# Patient Record
Sex: MALE | Race: WHITE | NOT HISPANIC OR LATINO | Employment: FULL TIME | URBAN - METROPOLITAN AREA
[De-identification: names, ages, dates, MRNs, and addresses within clinical notes are randomized per-mention and may not be internally consistent; named-entity substitution may affect disease eponyms.]

---

## 2017-11-27 ENCOUNTER — HOSPITAL ENCOUNTER (EMERGENCY)
Facility: MEDICAL CENTER | Age: 47
End: 2017-11-27
Attending: EMERGENCY MEDICINE
Payer: COMMERCIAL

## 2017-11-27 VITALS
HEIGHT: 69 IN | SYSTOLIC BLOOD PRESSURE: 118 MMHG | WEIGHT: 178.13 LBS | RESPIRATION RATE: 18 BRPM | BODY MASS INDEX: 26.38 KG/M2 | DIASTOLIC BLOOD PRESSURE: 66 MMHG | TEMPERATURE: 98.6 F | HEART RATE: 80 BPM | OXYGEN SATURATION: 94 %

## 2017-11-27 DIAGNOSIS — J45.41 MODERATE PERSISTENT ASTHMA WITH ACUTE EXACERBATION: ICD-10-CM

## 2017-11-27 DIAGNOSIS — J20.9 ACUTE BRONCHITIS, UNSPECIFIED ORGANISM: ICD-10-CM

## 2017-11-27 PROCEDURE — 99283 EMERGENCY DEPT VISIT LOW MDM: CPT | Mod: EDC

## 2017-11-27 PROCEDURE — 700111 HCHG RX REV CODE 636 W/ 250 OVERRIDE (IP): Mod: EDC | Performed by: EMERGENCY MEDICINE

## 2017-11-27 RX ORDER — AZITHROMYCIN 250 MG/1
TABLET, FILM COATED ORAL
Qty: 6 TAB | Refills: 0 | Status: SHIPPED | OUTPATIENT
Start: 2017-11-27 | End: 2018-01-19

## 2017-11-27 RX ORDER — PREDNISONE 20 MG/1
60 TABLET ORAL ONCE
Status: COMPLETED | OUTPATIENT
Start: 2017-11-27 | End: 2017-11-27

## 2017-11-27 RX ORDER — BUDESONIDE AND FORMOTEROL FUMARATE DIHYDRATE 80; 4.5 UG/1; UG/1
2 AEROSOL RESPIRATORY (INHALATION) 2 TIMES DAILY
Qty: 1 INHALER | Refills: 0 | Status: SHIPPED | OUTPATIENT
Start: 2017-11-27

## 2017-11-27 RX ORDER — ALBUTEROL SULFATE 90 UG/1
2 AEROSOL, METERED RESPIRATORY (INHALATION) EVERY 6 HOURS PRN
COMMUNITY
End: 2018-01-19 | Stop reason: SDUPTHER

## 2017-11-27 RX ORDER — PREDNISONE 20 MG/1
40 TABLET ORAL DAILY
Qty: 10 TAB | Refills: 0 | Status: SHIPPED | OUTPATIENT
Start: 2017-11-27 | End: 2017-12-02

## 2017-11-27 RX ADMIN — PREDNISONE 60 MG: 20 TABLET ORAL at 14:31

## 2017-11-27 ASSESSMENT — PAIN SCALES - GENERAL: PAINLEVEL_OUTOF10: 0

## 2017-11-27 NOTE — ED NOTES
Shelton Milton  Chief Complaint   Patient presents with   • Cold Symptoms     1 week   • Annual Wellness Visit     pt hasn't been seen in a while     [facility] with son for same sxs.  Patient to pediatric [facility], instructed parent to notify triage RN of any changes or worsening in condition.  NAD

## 2017-11-27 NOTE — ED PROVIDER NOTES
ED Provider Note    Scribed for Marlyn Mckenzie M.D. by Tristan Quezada. 11/27/2017, 2:00 PM.    Primary care provider: No primary care provider noted.  Means of arrival: Walk in  History obtained from: Patient  History limited by: None    CHIEF COMPLAINT  Chief Complaint   Patient presents with   • Cold Symptoms     1 week   • Annual Wellness Visit     pt hasn't been seen in a while       HPI  Shelton Milton is a 47 y.o. male With a history of asthma who presents to the Emergency Department complaining of a persistent sore throat onset 1 week ago. He is experiencing associated redness and swelling to his throat, chest tightness, shortness of breath and productive cough. He describes his cough as chronic and coughing up yellow/green phlegm. The patient has a history of asthma and uses an albuterol inhaler at home every day. He does not have a steroid inhaler at this time. No complaints of fever at this time.    REVIEW OF SYSTEMS  Pertinent positives include sore throat with redness and swelling, chest tightness, shortness of breath, productive cough. Pertinent negatives include no fever. See HPI for further details. E.     PAST MEDICAL HISTORY   has a past medical history of Asthma.    SURGICAL HISTORY   has a past surgical history that includes other abdominal surgery.    SOCIAL HISTORY  Social History   Substance Use Topics   • Smoking status: Never Smoker   • Smokeless tobacco: Never Used   • Alcohol use Yes      Comment: monthly      History   Drug Use No       FAMILY HISTORY  History reviewed. No pertinent family history.    CURRENT MEDICATIONS  Home Medications     Reviewed by Estephanie Luna R.N. (Registered Nurse) on 11/27/17 at 1329  Med List Status: Complete   Medication Last Dose Status   albuterol 108 (90 Base) MCG/ACT Aero Soln inhalation aerosol 11/27/2017 Active                ALLERGIES  Allergies   Allergen Reactions   • Codeine Nausea       PHYSICAL EXAM  VITAL SIGNS: /66   Pulse 80   Temp 37  "°C (98.6 °F)   Resp 18   Ht 1.753 m (5' 9\")   Wt 80.8 kg (178 lb 2.1 oz)   SpO2 94%   BMI 26.31 kg/m²     General: WDWN, nontoxic appearing in NAD; A+Ox3; V/S as above afebrile   Skin: warm and dry; good color; no rash   HEENT: White nasal drip noticed in the posterior pharynx, NCAT; EOMs intact; PERRL; no scleral icterus   Neck: FROM; no meningismus, no LAD   Cardiovascular: Regular heart rate and rhythm. No murmurs, rubs, or gallops; pulses 2+ bilaterally radially and DP areas   Lungs: Mild inspiratory and expiratory wheezes, good air movement bilaterally. No rhonchi, or rales.   Neurologic: CNs III-XII grossly intact; speech clear; distal sensation intact; strength 5/5 UE/LEs; gait steady   Psychiatric: Appropriate affect, normal mood                                                             COURSE & MEDICAL DECISION MAKING  Pertinent Labs & Imaging studies reviewed. (See chart for details)    Shelton Milton is a 47 y.o. male who presents complaining of cough and sputum production with wheezing.    2:00 PM - Patient seen and examined at bedside. I suspect reactive airways/asthma exacerbation with bronchitis. Patient was treated with prednisone 60 mg. I explained to the patient I will put him on a Z-pack due to his history of asthma and observed wheezes. We discussed using a steroid inhaler regularly to help alleviate the patient's breathing symptoms at home. I will contact the  to get the patient established with a PCP. Patient agrees with plan of care.    The patient will return for new or worsening symptoms and is stable at the time of discharge.    DISPOSITION:  Patient will be discharged home in stable condition.    FOLLOW UP:  [facility], Emergency Dept  [address] [address] [phone]    As needed, If symptoms worsen      OUTPATIENT MEDICATIONS:  Discharge Medication List as of 11/27/2017  2:24 PM      START taking these medications    Details "   predniSONE (DELTASONE) 20 MG Tab Take 2 Tabs by mouth every day for 5 days., Disp-10 Tab, R-0, Print Rx Paper      budesonide-formoterol (SYMBICORT) 80-4.5 MCG/ACT Aerosol Inhale 2 Puffs by mouth 2 Times a Day., Disp-1 Inhaler, R-0, Print Rx Paper      azithromycin (ZITHROMAX) 250 MG Tab Take two tabs by mouth on day one, then one tab by mouth daily on days 2-5., Disp-6 Tab, R-0, Print Rx Paper             FINAL IMPRESSION  1. Moderate persistent asthma with acute exacerbation    2. Acute bronchitis, unspecified organism          Tristan HERZOG (Scribdamion), am scribing for, and in the presence of, Marlyn Mckenzie M.D..    Electronically signed by: Tristan Quezada (Scribe), 11/27/2017    Marlyn HERZOG M.D. personally performed the services described in this documentation, as scribed by Tristan Quezada in my presence, and it is both accurate and complete.    The note accurately reflects work and decisions made by me.  Marlyn Mckenzie  11/28/2017  2:49 PM

## 2017-11-27 NOTE — ED NOTES
PT assessment complete. Agree with triage note. Pt c/o sore throat, redness and swelling for 1 week. Exposed to son who has been sick for a couple weeks. PT in gown. Educated on NPO status until cleared by MD. Pt is alert, active, age appropriate, and NAD. No needs. Will continue to monitor.

## 2017-11-27 NOTE — ED NOTES
Pt given meds. Discharge instructions provided to pt. Copy of instructions and rx for x3 provided to pt. Verbalized understanding. Instructed to follow up with PCP or return to ed with worsening symptoms. Educated on worsening symptoms. Educated on diet and fluid intake. Educated on pain management. Pt discharged to home. PT ambulated out of ED. Pt awake, alert, calm, NAD upon departure.

## 2017-11-27 NOTE — DISCHARGE INSTRUCTIONS
Take prednisone for 5 days  Start using Symbicort after you are finished with your prednisone  Take Z-Elliot as prescribed for possible bronchitis  Continue with albuterol every 4-6 hours as needed for wheezing  Follow-up with a primary care provider as scheduled  Return to the ER for chest pain, difficult to breathing, fever, or other concerns      Asthma, F.L.A.R.E.  Most people with asthma do not get so sick that they need emergency care. The fact that you had to get emergency care may mean:  · You are not taking your long-term control medicine the right way.   · You have not been prescribed any or enough long-term control medicine.   · You are still exposed to triggers that start your asthma symptoms.   You can avoid asthma flare-ups by using this F.L.A.R.E. plan until you see your primary doctor.  F  FOLLOW UP WITH YOUR PRIMARY DOCTOR - CALL TO MAKE AN APPOINTMENT TO BE SEEN.  · If you have trouble making an appointment, ask to speak to the office nurse.   · If you do not have a primary care doctor, call to get one.   At the follow-up appointment:  · Bring all of your medications and this plan with you.   · Make an asthma action plan with your doctor that you can follow every day to keep your asthma under control.   · Write down your questions and your doctor's answers.   This will make your emergency visits rare.  L  LEARN ABOUT YOUR ASTHMA MEDICINES. TAKE ALL OF THESE MEDICINES JUST AS THE DOCTOR TELLS YOU, EVEN IF YOU ARE FEELING MUCH BETTER.  Quick-relief or Rescue  · Kind of medicine   · Name of medicine   · How much   · How often & how long you need to take it   Long-term control  · Kind of medicine   · Name of medicine   · How much   · How often & how long you need to take it   Steroid pills or syrup  · Kind of medicine   · Name of medicine   · How much   · How often & how long you need to take it   A  ASTHMA IS A LIFE-LONG (CHRONIC) DISEASE.  Even though your breathing is better after getting emergency  "care, you still need to get long-term control of your asthma. If you do not, you are at risk for more severe flare-ups and even death.  · If you use quick-relief medicine more than 2 times per week, your asthma is not under control. You need to see your doctor or an asthma specialist to make a plan to get control of your asthma.   · Take long-term control medicine every day as ordered by your doctor.   · Figure out what things make your asthma flare up and try to stay away from these \"triggers.\"   R  RESPOND TO THESE WARNING SIGNS THAT YOUR ASTHMA IS GETTING WORSE:  · Your chest feels tight.   · You are short of breath.   · You are wheezing.   · You are coughing.   · Your peak flow is getting low.   Keep taking your medicines as prescribed and call your doctor.  E  EMERGENCY CARE MAY BE NEEDED IF YOU:  · Have trouble talking.   · Are working hard to breathe (may see skin sucking in at rib cage or above breast bone).   · Need to use quick-relief medicine more often than every 4 hours.   · See your peak flow dropping.   Take your quick-relief medicine and wait 20 minutes. If you do not feel better, take it again and wait 20 minutes. If you still do not feel better, take it again and call your doctor or 911 right away!  LEARN ABOUT ASTHMA  Asthma is a life-long disease that can make it hard for you to get air in and out of your lungs. Your asthma triggers make the air tubes in your lungs get smaller. These are the tubes that carry air in and out of your lungs.  Here is what happens:  · Small breathing tubes in your lungs swell and make extra mucus.   · Muscles around the small breathing tubes get tight and make them smaller.   · Smaller breathing tubes then get clogged with the extra mucus.   · Swelling, muscle tightness, and mucus make it harder for you to breathe. You start to cough and wheeze and your chest might feel tight.   Not all asthma flare-ups are the same. Some are worse than others. In a severe asthma " flare-up, the breathing tubes get so small that air cannot get in and out of the lungs. People can die if their asthma flare-up is severe.  ASTHMA MEDICINES  · Quick-relief or Rescue medicine: should help for about 4 hours, relaxes the muscles around your breathing tubes so air can get in and out. If you need to take quick-relief medicine more than 2 times per week, your asthma is not under control, and you should ask your doctor about long-term control medicine.   · Long-term control medicine: must be taken every day in order to work right. It keeps your breathing tubes from swelling, and can prevent most asthma flare-ups. This medicine cannot stop a flare-up once it starts. During flare-ups, use quick-relief medicine right away and take your long-term control medicine as usual.   · Steroid pills or syrup: can help the swelling in your breathing tubes go away. You must take this medicine just as the doctor tells you to. Do not skip a dose, and do not stop taking it unless a doctor tells you to stop.   TRIGGERS: TELL YOUR DOCTOR ABOUT THE THINGS THAT MAKE YOUR ASTHMA WORSE.  What started or triggered your asthma flare-up this time?  COMMON ASTHMA TRIGGERS:  · Breathing in chemicals, dusts, or fumes at work.   · Colds or flu.   · Animals.   · Dust.   · Pollen and mold.   · Strong odors.   · Weather.   · Exercise.   · Cigarette and other smoke.   · Medicines.   Smoking and secondhand smoke are asthma triggers. If you smoke, choose to quit. Never let others smoke near you or your children. Call your doctor or your health plan for help quitting.  FOR MORE INFORMATION  [facility]: www.GetAsthmaHelp.org  American Lung Association: www.lungusa.org  Asthma and Allergy Foundation of [place]: www.aafa.org  This plan and asthma information are based on the NAEPP Guidelines for the Diagnosis and Management of Asthma, (http://www.nhlbi.nig.gov/guidelines/asthma/asthgdln.htm).  Document Released: 07/26/2007  Document Revised: 03/11/2013 Document Reviewed: 10/04/2007  ExitCare® Patient Information ©2013 [employer].        Acute Bronchitis  Bronchitis is inflammation of the airways that extend from the windpipe into the lungs (bronchi). The inflammation often causes mucus to develop. This leads to a cough, which is the most common symptom of bronchitis.   In acute bronchitis, the condition usually develops suddenly and goes away over time, usually in a couple weeks. Smoking, allergies, and asthma can make bronchitis worse. Repeated episodes of bronchitis may cause further lung problems.   CAUSES  Acute bronchitis is most often caused by the same virus that causes a cold. The virus can spread from person to person (contagious) through coughing, sneezing, and touching contaminated objects.  SIGNS AND SYMPTOMS   · Cough.    · Fever.    · Coughing up mucus.    · Body aches.    · Chest congestion.    · Chills.    · Shortness of breath.    · Sore throat.    DIAGNOSIS   Acute bronchitis is usually diagnosed through a physical exam. Your health care provider will also ask you questions about your medical history. Tests, such as chest X-rays, are sometimes done to rule out other conditions.   TREATMENT   Acute bronchitis usually goes away in a couple weeks. Oftentimes, no medical treatment is necessary. Medicines are sometimes given for relief of fever or cough. Antibiotic medicines are usually not needed but may be prescribed in certain situations. In some cases, an inhaler may be recommended to help reduce shortness of breath and control the cough. A cool mist vaporizer may also be used to help thin bronchial secretions and make it easier to clear the chest.   HOME CARE INSTRUCTIONS  · Get plenty of rest.    · Drink enough fluids to keep your urine clear or pale yellow (unless you have a medical condition that requires fluid restriction). Increasing fluids may help thin your respiratory secretions (sputum) and reduce chest  congestion, and it will prevent dehydration.    · Take medicines only as directed by your health care provider.  · If you were prescribed an antibiotic medicine, finish it all even if you start to feel better.  · Avoid smoking and secondhand smoke. Exposure to cigarette smoke or irritating chemicals will make bronchitis worse. If you are a smoker, consider using nicotine gum or skin patches to help control withdrawal symptoms. Quitting smoking will help your lungs heal faster.    · Reduce the chances of another bout of acute bronchitis by washing your hands frequently, avoiding people with cold symptoms, and trying not to touch your hands to your mouth, nose, or eyes.    · Keep all follow-up visits as directed by your health care provider.    SEEK MEDICAL CARE IF:  Your symptoms do not improve after 1 week of treatment.   SEEK IMMEDIATE MEDICAL CARE IF:  · You develop an increased fever or chills.    · You have chest pain.    · You have severe shortness of breath.  · You have bloody sputum.    · You develop dehydration.  · You faint or repeatedly feel like you are going to pass out.  · You develop repeated vomiting.  · You develop a severe headache.  MAKE SURE YOU:   · Understand these instructions.  · Will watch your condition.  · Will get help right away if you are not doing well or get worse.     This information is not intended to replace advice given to you by your health care provider. Make sure you discuss any questions you have with your health care provider.     Document Released: 01/25/2006 Document Revised: 01/08/2016 Document Reviewed: 06/10/2014  L[employer] Interactive Patient Education ©2016 [employer]

## 2017-11-28 ENCOUNTER — PATIENT OUTREACH (OUTPATIENT)
Dept: HEALTH INFORMATION MANAGEMENT | Facility: OTHER | Age: 47
End: 2017-11-28

## 2017-11-28 NOTE — PROGRESS NOTES
Placed discharge outreach phone call to patient s/p ER discharge 11/27/17.  Left voicemail providing my contact information and instructions to call with any questions or concerns.

## 2018-01-19 ENCOUNTER — HOSPITAL ENCOUNTER (OUTPATIENT)
Dept: RADIOLOGY | Facility: MEDICAL CENTER | Age: 48
End: 2018-01-19
Attending: FAMILY MEDICINE
Payer: COMMERCIAL

## 2018-01-19 ENCOUNTER — OFFICE VISIT (OUTPATIENT)
Dept: MEDICAL GROUP | Facility: PHYSICIAN GROUP | Age: 48
End: 2018-01-19
Payer: COMMERCIAL

## 2018-01-19 VITALS
WEIGHT: 180 LBS | HEIGHT: 69 IN | HEART RATE: 74 BPM | DIASTOLIC BLOOD PRESSURE: 74 MMHG | TEMPERATURE: 98 F | SYSTOLIC BLOOD PRESSURE: 108 MMHG | OXYGEN SATURATION: 96 % | BODY MASS INDEX: 26.66 KG/M2

## 2018-01-19 DIAGNOSIS — M25.521 RIGHT ELBOW PAIN: ICD-10-CM

## 2018-01-19 DIAGNOSIS — G89.29 CHRONIC PAIN OF BOTH KNEES: ICD-10-CM

## 2018-01-19 DIAGNOSIS — N52.8 OTHER MALE ERECTILE DYSFUNCTION: ICD-10-CM

## 2018-01-19 DIAGNOSIS — M25.561 CHRONIC PAIN OF BOTH KNEES: ICD-10-CM

## 2018-01-19 DIAGNOSIS — M25.562 CHRONIC PAIN OF BOTH KNEES: ICD-10-CM

## 2018-01-19 DIAGNOSIS — K21.9 GASTROESOPHAGEAL REFLUX DISEASE WITHOUT ESOPHAGITIS: ICD-10-CM

## 2018-01-19 DIAGNOSIS — G47.33 OSA (OBSTRUCTIVE SLEEP APNEA): ICD-10-CM

## 2018-01-19 PROBLEM — M79.601 PAIN OF RIGHT UPPER EXTREMITY: Status: RESOLVED | Noted: 2018-01-19 | Resolved: 2018-01-19

## 2018-01-19 PROBLEM — M79.601 PAIN OF RIGHT UPPER EXTREMITY: Status: ACTIVE | Noted: 2018-01-19

## 2018-01-19 PROCEDURE — 99204 OFFICE O/P NEW MOD 45 MIN: CPT | Performed by: FAMILY MEDICINE

## 2018-01-19 PROCEDURE — 73080 X-RAY EXAM OF ELBOW: CPT | Mod: RT

## 2018-01-19 RX ORDER — DEXAMETHASONE 4 MG/1
4 TABLET ORAL 2 TIMES DAILY
Qty: 10 TAB | Refills: 0 | Status: SHIPPED | OUTPATIENT
Start: 2018-01-19 | End: 2018-03-02

## 2018-01-19 RX ORDER — ALBUTEROL SULFATE 90 UG/1
2 AEROSOL, METERED RESPIRATORY (INHALATION) EVERY 6 HOURS PRN
Qty: 8.5 G | Refills: 3 | Status: SHIPPED | OUTPATIENT
Start: 2018-01-19

## 2018-01-20 NOTE — ASSESSMENT & PLAN NOTE
New problem. Patient is reporting the last couple of years he has had difficulties with both achieving and maintaining an erection. He denies any problems with orgasm. He is reporting also some issues with starting a urinary flow. He denies any increase in urinary frequency or pain with urination.

## 2018-01-20 NOTE — ASSESSMENT & PLAN NOTE
Ongoing issue. Patient reports achy pain in both of his knees. He reports that the pain is worse with activity; better with rest. He takes over-the-counter ibuprofen which is mildly beneficial. He denies any swelling, numbness, tingling, weakness.

## 2018-01-20 NOTE — ASSESSMENT & PLAN NOTE
Long-standing problem. Patient reports a greater than 10 year history of pain on the outside of the right elbow. He reports that in the last several months it has gotten progressively worse to the point that with minimal activity or with mild palpation. Patient is reporting the pain can radiate down his forearm. He works as a  and so he uses his arms frequently. He denies any swelling. He has been using over-the-counter ibuprofen but it is not been beneficial in taking care of the pain

## 2018-01-20 NOTE — ASSESSMENT & PLAN NOTE
Long-standing history. Patient reports a history of reflux secondary to hiatal hernia. He states that in his lifetime he has had 2 surgeries to correct the hiatal hernia. He reports that since his second surgery 3 years ago he continues to have reflux. We have discussed some diet changes that he needs to make to help with his reflux issues. He currently does not take any medication for this problem

## 2018-01-20 NOTE — PROGRESS NOTES
Shelton Milton is a 47 y.o. male here for elbow pain, knee pain, GERD, JULIAN, fatigue, ED  HPI:  46 yo M here today to est care with his partner present and presents with the following concerns:   Right elbow pain  Long-standing problem. Patient reports a greater than 10 year history of pain on the outside of the right elbow. He reports that in the last several months it has gotten progressively worse to the point that with minimal activity or with mild palpation. Patient is reporting the pain can radiate down his forearm. He works as a  and so he uses his arms frequently. He denies any swelling. He has been using over-the-counter ibuprofen but it is not been beneficial in taking care of the pain    Other male erectile dysfunction  New problem. Patient is reporting the last couple of years he has had difficulties with both achieving and maintaining an erection. He denies any problems with orgasm. He is reporting also some issues with starting a urinary flow. He denies any increase in urinary frequency or pain with urination.    JULIAN (obstructive sleep apnea)  Ongoing issue. Patient reports that he was diagnosed several years ago with obstructive sleep apnea but never completed the workup to get a CPAP. His partner is reporting that he does snore and is very restless when he is sleeping at night.    Gastroesophageal reflux disease without esophagitis  Long-standing history. Patient reports a history of reflux secondary to hiatal hernia. He states that in his lifetime he has had 2 surgeries to correct the hiatal hernia. He reports that since his second surgery 3 years ago he continues to have reflux. We have discussed some diet changes that he needs to make to help with his reflux issues. He currently does not take any medication for this problem    Chronic pain of both knees  Ongoing issue. Patient reports achy pain in both of his knees. He reports that the pain is worse with activity; better with rest. He takes  "over-the-counter ibuprofen which is mildly beneficial. He denies any swelling, numbness, tingling, weakness.    Current medicines (including changes today)  Current Outpatient Prescriptions   Medication Sig Dispense Refill   • dexamethasone (DECADRON) 4 MG Tab Take 1 Tab by mouth 2 times a day. 10 Tab 0   • albuterol 108 (90 Base) MCG/ACT Aero Soln inhalation aerosol Inhale 2 Puffs by mouth every 6 hours as needed for Shortness of Breath. 8.5 g 3   • budesonide-formoterol (SYMBICORT) 80-4.5 MCG/ACT Aerosol Inhale 2 Puffs by mouth 2 Times a Day. 1 Inhaler 0     No current facility-administered medications for this visit.      He  has a past medical history of Asthma and GERD (gastroesophageal reflux disease). He also has no past medical history of Hyperlipidemia or Hypertension.  He  has a past surgical history that includes other abdominal surgery and hiatal hernia repair.  Social History   Substance Use Topics   • Smoking status: Current Every Day Smoker     Packs/day: 0.25     Years: 4.00     Types: Cigarettes   • Smokeless tobacco: Never Used   • Alcohol use Yes      Comment: monthly     Social History     Social History Narrative   • No narrative on file     Family History   Problem Relation Age of Onset   • Diabetes Mother    • Diabetes Father    • Hypertension Father    • Other Father      gout   • Stroke Sister    • Diabetes Paternal Uncle    • Cancer Paternal Uncle      bladder     Family Status   Relation Status   • Mother Alive   • Father Alive   • Sister Alive   • Paternal Uncle          ROS  +elbow and bilateral knee pain; no chest pain or abdominal pain  All other systems reviewed and are negative     Objective:     Blood pressure 108/74, pulse 74, temperature 36.7 °C (98 °F), height 1.753 m (5' 9\"), weight 81.6 kg (180 lb), SpO2 96 %. Body mass index is 26.58 kg/m².  Physical Exam:    Constitutional: Alert, no distress.  Skin: Warm, dry, good turgor, no rashes in visible areas.  Eye: Equal, round and " reactive, conjunctiva clear, lids normal.  ENMT: Lips without lesions, good dentition, oropharynx clear.  Neck: Trachea midline, no masses, no thyromegaly. No cervical or supraclavicular lymphadenopathy.  Respiratory: Unlabored respiratory effort, lungs clear to auscultation, no wheezes, no ronchi.  Cardiovascular: Normal S1, S2, no murmur, no edema.  Psych: Alert and oriented x3, normal affect and mood.  Neuro: CN 2-12 grossly intact  MSK: right elbow - significant TTP to the lateral epicondyle groove; no swelling/erythema    Assessment and Plan:   The following treatment plan was discussed     1. JULIAN (obstructive sleep apnea)  REFERRAL TO PULMONOLOGY    Uncontrolled. Referral to pulmonology for further evaluation and treatment   2. Right elbow pain  DX-ELBOW-COMPLETE 3+ RIGHT    Uncontrolled. Sent for x-ray for initial evaluation; prescription for dexamethasone provided; consider MRI   3. Other male erectile dysfunction  COMP METABOLIC PANEL    LIPID PROFILE    VITAMIN D,25 HYDROXY    PROSTATE SPECIFIC AG SCREENING    Uncontrolled. Sent for labs and monitor for results   4. Chronic pain of both knees      Uncontrolled. Reevaluate at next appointment   5. Gastroesophageal reflux disease without esophagitis      Uncontrolled. Reevaluate at next appointment        Records requested.  Followup: Return in about 6 weeks (around 3/2/2018) for knee/reflux/lab review, Long.

## 2018-01-20 NOTE — ASSESSMENT & PLAN NOTE
Ongoing issue. Patient reports that he was diagnosed several years ago with obstructive sleep apnea but never completed the workup to get a CPAP. His partner is reporting that he does snore and is very restless when he is sleeping at night.

## 2018-01-23 ENCOUNTER — TELEPHONE (OUTPATIENT)
Dept: MEDICAL GROUP | Facility: PHYSICIAN GROUP | Age: 48
End: 2018-01-23

## 2018-01-24 NOTE — TELEPHONE ENCOUNTER
----- Message from Mita Noriega D.O. sent at 1/23/2018  7:51 AM PST -----  Please advise pt that the x-ray of the elbow did not show any breaks in the bone or dislocation. We could consider an MRI; please let me know.    Mita Noriega D.O.

## 2018-02-08 DIAGNOSIS — G47.33 OSA (OBSTRUCTIVE SLEEP APNEA): ICD-10-CM

## 2018-02-17 ENCOUNTER — HOSPITAL ENCOUNTER (OUTPATIENT)
Dept: LAB | Facility: MEDICAL CENTER | Age: 48
End: 2018-02-17
Attending: FAMILY MEDICINE
Payer: COMMERCIAL

## 2018-02-17 DIAGNOSIS — N52.8 OTHER MALE ERECTILE DYSFUNCTION: ICD-10-CM

## 2018-02-17 LAB
25(OH)D3 SERPL-MCNC: 14 NG/ML (ref 30–100)
ALBUMIN SERPL BCP-MCNC: 4.5 G/DL (ref 3.2–4.9)
ALBUMIN/GLOB SERPL: 2 G/DL
ALP SERPL-CCNC: 46 U/L (ref 30–99)
ALT SERPL-CCNC: 34 U/L (ref 2–50)
ANION GAP SERPL CALC-SCNC: 6 MMOL/L (ref 0–11.9)
AST SERPL-CCNC: 24 U/L (ref 12–45)
BILIRUB SERPL-MCNC: 0.5 MG/DL (ref 0.1–1.5)
BUN SERPL-MCNC: 13 MG/DL (ref 8–22)
CALCIUM SERPL-MCNC: 9.1 MG/DL (ref 8.5–10.5)
CHLORIDE SERPL-SCNC: 105 MMOL/L (ref 96–112)
CHOLEST SERPL-MCNC: 234 MG/DL (ref 100–199)
CO2 SERPL-SCNC: 27 MMOL/L (ref 20–33)
CREAT SERPL-MCNC: 0.7 MG/DL (ref 0.5–1.4)
GLOBULIN SER CALC-MCNC: 2.3 G/DL (ref 1.9–3.5)
GLUCOSE SERPL-MCNC: 93 MG/DL (ref 65–99)
HDLC SERPL-MCNC: 55 MG/DL
LDLC SERPL CALC-MCNC: 154 MG/DL
POTASSIUM SERPL-SCNC: 3.9 MMOL/L (ref 3.6–5.5)
PROT SERPL-MCNC: 6.8 G/DL (ref 6–8.2)
PSA SERPL-MCNC: 0.71 NG/ML (ref 0–4)
SODIUM SERPL-SCNC: 138 MMOL/L (ref 135–145)
TRIGL SERPL-MCNC: 125 MG/DL (ref 0–149)

## 2018-02-17 PROCEDURE — 84153 ASSAY OF PSA TOTAL: CPT

## 2018-02-17 PROCEDURE — 80053 COMPREHEN METABOLIC PANEL: CPT

## 2018-02-17 PROCEDURE — 80061 LIPID PANEL: CPT

## 2018-02-17 PROCEDURE — 82306 VITAMIN D 25 HYDROXY: CPT

## 2018-02-17 PROCEDURE — 36415 COLL VENOUS BLD VENIPUNCTURE: CPT

## 2018-03-02 ENCOUNTER — HOSPITAL ENCOUNTER (OUTPATIENT)
Dept: RADIOLOGY | Facility: MEDICAL CENTER | Age: 48
End: 2018-03-02
Attending: FAMILY MEDICINE
Payer: COMMERCIAL

## 2018-03-02 ENCOUNTER — OFFICE VISIT (OUTPATIENT)
Dept: MEDICAL GROUP | Facility: PHYSICIAN GROUP | Age: 48
End: 2018-03-02
Payer: COMMERCIAL

## 2018-03-02 VITALS
OXYGEN SATURATION: 98 % | TEMPERATURE: 97.3 F | WEIGHT: 180 LBS | HEART RATE: 70 BPM | DIASTOLIC BLOOD PRESSURE: 72 MMHG | HEIGHT: 69 IN | SYSTOLIC BLOOD PRESSURE: 120 MMHG | BODY MASS INDEX: 26.66 KG/M2

## 2018-03-02 DIAGNOSIS — K21.9 GASTROESOPHAGEAL REFLUX DISEASE WITHOUT ESOPHAGITIS: ICD-10-CM

## 2018-03-02 DIAGNOSIS — M25.561 CHRONIC PAIN OF BOTH KNEES: ICD-10-CM

## 2018-03-02 DIAGNOSIS — M25.562 CHRONIC PAIN OF BOTH KNEES: ICD-10-CM

## 2018-03-02 DIAGNOSIS — G89.29 CHRONIC PAIN OF BOTH KNEES: ICD-10-CM

## 2018-03-02 DIAGNOSIS — M25.521 RIGHT ELBOW PAIN: ICD-10-CM

## 2018-03-02 DIAGNOSIS — E78.5 DYSLIPIDEMIA: ICD-10-CM

## 2018-03-02 DIAGNOSIS — R79.89 LOW VITAMIN D LEVEL: ICD-10-CM

## 2018-03-02 PROCEDURE — 99214 OFFICE O/P EST MOD 30 MIN: CPT | Performed by: FAMILY MEDICINE

## 2018-03-02 PROCEDURE — 73565 X-RAY EXAM OF KNEES: CPT

## 2018-03-03 NOTE — PROGRESS NOTES
Subjective:   Shelton Milton is a 47 y.o. male here today for bilateral knee pain, elbow pain, reflux, elevated cholesterol, low vitamin D    Chronic pain of both knees  Ongoing issue. Patient reports a long-standing history of pain in both of his knees. He describes the pain is achy in nature; comes and goes; it can be worse at the end of the day. Due to his job he does spend a lot of time crawling around on his knees and lifting heavy objects. He denies any specific trauma; he denies any swelling; currently does not take anything for the pain    Right elbow pain  Ongoing issue. Patient reports that the oral steroids were only mildly beneficial for a couple of days but then the pain returned after he stopped the medication. He currently has a mild amount of swelling and tenderness on the lateral aspect of the elbow. Continues to have some mild numbness and tingling that radiates down into the forearm. Current x-ray was negative for any significant findings    Gastroesophageal reflux disease without esophagitis  Ongoing issue. Patient currently avoids foods that bother his reflux. He has not been evaluated for H. pylori. He currently doesn't take any medication for this problem.    Dyslipidemia  New problem. Recent lab work is unrevealing detail with the patient wishes that he has a mildly elevated total cholesterol and LDL cholesterol. On discussion it appears the patient does consume a small amount high-fat foods on a very regular basis.    Low vitamin D level  New problem. Recent lab work is been reviewed in detail with the patient which shows that his vitamin D levels below recommended range at 14. He currently does not take any supplement.         Current medicines (including changes today)  Current Outpatient Prescriptions   Medication Sig Dispense Refill   • albuterol 108 (90 Base) MCG/ACT Aero Soln inhalation aerosol Inhale 2 Puffs by mouth every 6 hours as needed for Shortness of Breath. 8.5 g 3   •  "budesonide-formoterol (SYMBICORT) 80-4.5 MCG/ACT Aerosol Inhale 2 Puffs by mouth 2 Times a Day. 1 Inhaler 0     No current facility-administered medications for this visit.      He  has a past medical history of Asthma; Dyslipidemia (3/2/2018); and GERD (gastroesophageal reflux disease). He also has no past medical history of Hypertension.    ROS   No chest pain, no shortness of breath, no abdominal pain, no skin lesion, no headache  + Right elbow pain, bilateral knee pain       Objective:     Blood pressure 120/72, pulse 70, temperature 36.3 °C (97.3 °F), height 1.753 m (5' 9\"), weight 81.6 kg (180 lb), SpO2 98 %. Body mass index is 26.58 kg/m².   Physical Exam:  Alert, oriented in no acute distress.  Eye contact is good, speech goal directed, affect calm  HEENT: conjunctiva non-injected, sclera non-icteric.  Pinna normal. TM pearly gray.   Oral mucous membranes pink and moist with no lesions.  Neck No adenopathy or masses in the neck or supraclavicular regions.  Lungs: clear to auscultation bilaterally with good excursion.  CV: regular rate and rhythm.  Abdomen: soft, nontender, No CVAT  Ext: no edema, color normal, vascularity normal, temperature normal  MSK: Bilateral knees-no swelling, no erythema, no tenderness to palpation, crepitus on flexion and extension      Assessment and Plan:   The following treatment plan was discussed     1. Right elbow pain  MR-ELBOW-W/O RIGHT    Uncontrolled. Sent for MRI for further evaluation; patient will need referral to orthopedic specialist after MRI results   2. Chronic pain of both knees  DX-KNEES-AP BILATERAL STANDING    Uncontrolled. Sent for standing x-rays and then patient will need referral to orthopedic specialist   3. Gastroesophageal reflux disease without esophagitis  H.PYLORI STOOL ANTIGEN    Stable. Evaluate for H. pylori; monitor for results   4. Dyslipidemia  LIPID PROFILE    Uncontrolled. Patient will make lifestyle changes; recheck labs in 6 months   5. Low " vitamin D level  VITAMIN D,25 HYDROXY    Uncontrolled. Patient will take supplemental vitamin D 2000 international units twice daily; recheck labs in 6 months       Followup: Return in about 6 months (around 9/2/2018) for lab review, Short.

## 2018-03-03 NOTE — ASSESSMENT & PLAN NOTE
Ongoing issue. Patient reports that the oral steroids were only mildly beneficial for a couple of days but then the pain returned after he stopped the medication. He currently has a mild amount of swelling and tenderness on the lateral aspect of the elbow. Continues to have some mild numbness and tingling that radiates down into the forearm. Current x-ray was negative for any significant findings

## 2018-03-03 NOTE — ASSESSMENT & PLAN NOTE
New problem. Recent lab work is been reviewed in detail with the patient which shows that his vitamin D levels below recommended range at 14. He currently does not take any supplement.

## 2018-03-03 NOTE — ASSESSMENT & PLAN NOTE
New problem. Recent lab work is unrevealing detail with the patient wishes that he has a mildly elevated total cholesterol and LDL cholesterol. On discussion it appears the patient does consume a small amount high-fat foods on a very regular basis.

## 2018-03-03 NOTE — ASSESSMENT & PLAN NOTE
Ongoing issue. Patient currently avoids foods that bother his reflux. He has not been evaluated for H. pylori. He currently doesn't take any medication for this problem.

## 2018-03-03 NOTE — ASSESSMENT & PLAN NOTE
Ongoing issue. Patient reports a long-standing history of pain in both of his knees. He describes the pain is achy in nature; comes and goes; it can be worse at the end of the day. Due to his job he does spend a lot of time crawling around on his knees and lifting heavy objects. He denies any specific trauma; he denies any swelling; currently does not take anything for the pain

## 2018-04-23 ENCOUNTER — HOSPITAL ENCOUNTER (OUTPATIENT)
Dept: RADIOLOGY | Facility: MEDICAL CENTER | Age: 48
End: 2018-04-23
Attending: FAMILY MEDICINE
Payer: COMMERCIAL

## 2018-04-23 DIAGNOSIS — M25.521 RIGHT ELBOW PAIN: ICD-10-CM

## 2018-04-23 PROCEDURE — 73221 MRI JOINT UPR EXTREM W/O DYE: CPT | Mod: RT

## 2024-09-11 ENCOUNTER — HOSPITAL ENCOUNTER (INPATIENT)
Facility: MEDICAL CENTER | Age: 54
LOS: 2 days | DRG: 156 | End: 2024-09-13
Attending: EMERGENCY MEDICINE | Admitting: INTERNAL MEDICINE
Payer: COMMERCIAL

## 2024-09-11 ENCOUNTER — HOSPITAL ENCOUNTER (OUTPATIENT)
Dept: RADIOLOGY | Facility: MEDICAL CENTER | Age: 54
End: 2024-09-11

## 2024-09-11 ENCOUNTER — APPOINTMENT (OUTPATIENT)
Dept: RADIOLOGY | Facility: MEDICAL CENTER | Age: 54
DRG: 156 | End: 2024-09-11
Attending: EMERGENCY MEDICINE
Payer: COMMERCIAL

## 2024-09-11 DIAGNOSIS — K11.5 STONE OF SALIVARY GLAND OR DUCT: ICD-10-CM

## 2024-09-11 DIAGNOSIS — J45.20 MILD INTERMITTENT ASTHMA, UNSPECIFIED WHETHER COMPLICATED: ICD-10-CM

## 2024-09-11 DIAGNOSIS — K11.20 SIALOADENITIS: ICD-10-CM

## 2024-09-11 DIAGNOSIS — K11.20 SALIVARY GLAND INFECTION: ICD-10-CM

## 2024-09-11 PROBLEM — E87.6 HYPOKALEMIA: Status: ACTIVE | Noted: 2024-09-11

## 2024-09-11 PROBLEM — R73.9 HYPERGLYCEMIA: Status: ACTIVE | Noted: 2024-09-11

## 2024-09-11 PROBLEM — J45.909 ASTHMA: Status: ACTIVE | Noted: 2024-09-11

## 2024-09-11 LAB
ALBUMIN SERPL BCP-MCNC: 3.7 G/DL (ref 3.2–4.9)
ALBUMIN/GLOB SERPL: 1 G/DL
ALP SERPL-CCNC: 148 U/L (ref 30–99)
ALT SERPL-CCNC: 45 U/L (ref 2–50)
ANION GAP SERPL CALC-SCNC: 17 MMOL/L (ref 7–16)
AST SERPL-CCNC: 20 U/L (ref 12–45)
BILIRUB SERPL-MCNC: 0.6 MG/DL (ref 0.1–1.5)
BUN SERPL-MCNC: 21 MG/DL (ref 8–22)
CALCIUM ALBUM COR SERPL-MCNC: 8.7 MG/DL (ref 8.5–10.5)
CALCIUM SERPL-MCNC: 8.5 MG/DL (ref 8.5–10.5)
CHLORIDE SERPL-SCNC: 98 MMOL/L (ref 96–112)
CO2 SERPL-SCNC: 20 MMOL/L (ref 20–33)
CREAT SERPL-MCNC: 0.73 MG/DL (ref 0.5–1.4)
EST. AVERAGE GLUCOSE BLD GHB EST-MCNC: 131 MG/DL
GFR SERPLBLD CREATININE-BSD FMLA CKD-EPI: 109 ML/MIN/1.73 M 2
GLOBULIN SER CALC-MCNC: 3.8 G/DL (ref 1.9–3.5)
GLUCOSE SERPL-MCNC: 152 MG/DL (ref 65–99)
HBA1C MFR BLD: 6.2 % (ref 4–5.6)
LACTATE SERPL-SCNC: 1.2 MMOL/L (ref 0.5–2)
POTASSIUM SERPL-SCNC: 3.4 MMOL/L (ref 3.6–5.5)
PROT SERPL-MCNC: 7.5 G/DL (ref 6–8.2)
SODIUM SERPL-SCNC: 135 MMOL/L (ref 135–145)

## 2024-09-11 PROCEDURE — 71045 X-RAY EXAM CHEST 1 VIEW: CPT

## 2024-09-11 PROCEDURE — 80053 COMPREHEN METABOLIC PANEL: CPT

## 2024-09-11 PROCEDURE — 83036 HEMOGLOBIN GLYCOSYLATED A1C: CPT

## 2024-09-11 PROCEDURE — 36415 COLL VENOUS BLD VENIPUNCTURE: CPT

## 2024-09-11 PROCEDURE — 770006 HCHG ROOM/CARE - MED/SURG/GYN SEMI*

## 2024-09-11 PROCEDURE — 83605 ASSAY OF LACTIC ACID: CPT

## 2024-09-11 PROCEDURE — 99223 1ST HOSP IP/OBS HIGH 75: CPT | Mod: AI | Performed by: INTERNAL MEDICINE

## 2024-09-11 PROCEDURE — 99285 EMERGENCY DEPT VISIT HI MDM: CPT

## 2024-09-11 RX ORDER — PROMETHAZINE HYDROCHLORIDE 25 MG/1
12.5-25 TABLET ORAL EVERY 4 HOURS PRN
Status: DISCONTINUED | OUTPATIENT
Start: 2024-09-11 | End: 2024-09-13 | Stop reason: HOSPADM

## 2024-09-11 RX ORDER — ALBUTEROL SULFATE 90 UG/1
2 INHALANT RESPIRATORY (INHALATION) EVERY 6 HOURS PRN
Status: DISCONTINUED | OUTPATIENT
Start: 2024-09-11 | End: 2024-09-13 | Stop reason: HOSPADM

## 2024-09-11 RX ORDER — PROCHLORPERAZINE EDISYLATE 5 MG/ML
5-10 INJECTION INTRAMUSCULAR; INTRAVENOUS EVERY 4 HOURS PRN
Status: DISCONTINUED | OUTPATIENT
Start: 2024-09-11 | End: 2024-09-13 | Stop reason: HOSPADM

## 2024-09-11 RX ORDER — IBUPROFEN 200 MG
600 TABLET ORAL
COMMUNITY

## 2024-09-11 RX ORDER — PROMETHAZINE HYDROCHLORIDE 25 MG/1
12.5-25 SUPPOSITORY RECTAL EVERY 4 HOURS PRN
Status: DISCONTINUED | OUTPATIENT
Start: 2024-09-11 | End: 2024-09-13 | Stop reason: HOSPADM

## 2024-09-11 RX ORDER — ENOXAPARIN SODIUM 100 MG/ML
40 INJECTION SUBCUTANEOUS DAILY
Status: DISCONTINUED | OUTPATIENT
Start: 2024-09-11 | End: 2024-09-13 | Stop reason: HOSPADM

## 2024-09-11 RX ORDER — IBUPROFEN 600 MG/1
600 TABLET, FILM COATED ORAL
Status: DISCONTINUED | OUTPATIENT
Start: 2024-09-11 | End: 2024-09-13 | Stop reason: HOSPADM

## 2024-09-11 RX ORDER — OXYCODONE HYDROCHLORIDE 5 MG/1
2.5 TABLET ORAL
Status: DISCONTINUED | OUTPATIENT
Start: 2024-09-11 | End: 2024-09-13

## 2024-09-11 RX ORDER — SODIUM CHLORIDE 9 MG/ML
INJECTION, SOLUTION INTRAVENOUS CONTINUOUS
Status: DISCONTINUED | OUTPATIENT
Start: 2024-09-12 | End: 2024-09-12

## 2024-09-11 RX ORDER — ONDANSETRON 2 MG/ML
4 INJECTION INTRAMUSCULAR; INTRAVENOUS EVERY 4 HOURS PRN
Status: DISCONTINUED | OUTPATIENT
Start: 2024-09-11 | End: 2024-09-13 | Stop reason: HOSPADM

## 2024-09-11 RX ORDER — AMOXICILLIN 250 MG
2 CAPSULE ORAL EVERY EVENING
Status: DISCONTINUED | OUTPATIENT
Start: 2024-09-12 | End: 2024-09-13 | Stop reason: HOSPADM

## 2024-09-11 RX ORDER — DEXTROSE MONOHYDRATE 25 G/50ML
25 INJECTION, SOLUTION INTRAVENOUS
Status: DISCONTINUED | OUTPATIENT
Start: 2024-09-11 | End: 2024-09-13 | Stop reason: HOSPADM

## 2024-09-11 RX ORDER — CLINDAMYCIN HCL 300 MG
300 CAPSULE ORAL 3 TIMES DAILY
Status: ON HOLD | COMMUNITY
Start: 2024-09-09 | End: 2024-09-13

## 2024-09-11 RX ORDER — OXYCODONE HYDROCHLORIDE 5 MG/1
5 TABLET ORAL
Status: DISCONTINUED | OUTPATIENT
Start: 2024-09-11 | End: 2024-09-13

## 2024-09-11 RX ORDER — MORPHINE SULFATE 4 MG/ML
2 INJECTION INTRAVENOUS
Status: DISCONTINUED | OUTPATIENT
Start: 2024-09-11 | End: 2024-09-13

## 2024-09-11 RX ORDER — SODIUM CHLORIDE AND POTASSIUM CHLORIDE 300; 900 MG/100ML; MG/100ML
INJECTION, SOLUTION INTRAVENOUS CONTINUOUS
Status: DISCONTINUED | OUTPATIENT
Start: 2024-09-11 | End: 2024-09-12

## 2024-09-11 RX ORDER — FAMOTIDINE 20 MG/1
20 TABLET, FILM COATED ORAL 2 TIMES DAILY
Status: DISCONTINUED | OUTPATIENT
Start: 2024-09-11 | End: 2024-09-13 | Stop reason: HOSPADM

## 2024-09-11 RX ORDER — ONDANSETRON 4 MG/1
4 TABLET, ORALLY DISINTEGRATING ORAL EVERY 4 HOURS PRN
Status: DISCONTINUED | OUTPATIENT
Start: 2024-09-11 | End: 2024-09-13 | Stop reason: HOSPADM

## 2024-09-11 RX ORDER — LABETALOL HYDROCHLORIDE 5 MG/ML
10 INJECTION, SOLUTION INTRAVENOUS EVERY 4 HOURS PRN
Status: DISCONTINUED | OUTPATIENT
Start: 2024-09-11 | End: 2024-09-13 | Stop reason: HOSPADM

## 2024-09-11 RX ORDER — ACETAMINOPHEN 325 MG/1
650 TABLET ORAL EVERY 6 HOURS PRN
Status: DISCONTINUED | OUTPATIENT
Start: 2024-09-11 | End: 2024-09-13 | Stop reason: HOSPADM

## 2024-09-11 RX ORDER — POLYETHYLENE GLYCOL 3350 17 G/17G
1 POWDER, FOR SOLUTION ORAL
Status: DISCONTINUED | OUTPATIENT
Start: 2024-09-11 | End: 2024-09-13 | Stop reason: HOSPADM

## 2024-09-11 RX ORDER — DEXAMETHASONE SODIUM PHOSPHATE 4 MG/ML
4 INJECTION, SOLUTION INTRA-ARTICULAR; INTRALESIONAL; INTRAMUSCULAR; INTRAVENOUS; SOFT TISSUE EVERY 6 HOURS
Status: DISCONTINUED | OUTPATIENT
Start: 2024-09-12 | End: 2024-09-13 | Stop reason: HOSPADM

## 2024-09-11 ASSESSMENT — ENCOUNTER SYMPTOMS
POLYDIPSIA: 0
HALLUCINATIONS: 0
PHOTOPHOBIA: 0
FOCAL WEAKNESS: 0
PALPITATIONS: 0
BRUISES/BLEEDS EASILY: 0
BACK PAIN: 0
SPUTUM PRODUCTION: 0
SPEECH CHANGE: 0
NERVOUS/ANXIOUS: 0
HEADACHES: 0
DOUBLE VISION: 0
NECK PAIN: 0
NAUSEA: 0
BLURRED VISION: 0
WEIGHT LOSS: 0
FEVER: 0
FLANK PAIN: 0
TREMORS: 0
ORTHOPNEA: 0
COUGH: 0
CHILLS: 0
HEMOPTYSIS: 0
VOMITING: 0
HEARTBURN: 0

## 2024-09-11 ASSESSMENT — LIFESTYLE VARIABLES: SUBSTANCE_ABUSE: 0

## 2024-09-12 LAB
ALBUMIN SERPL BCP-MCNC: 3.1 G/DL (ref 3.2–4.9)
ALBUMIN/GLOB SERPL: 0.8 G/DL
ALP SERPL-CCNC: 125 U/L (ref 30–99)
ALT SERPL-CCNC: 35 U/L (ref 2–50)
ANION GAP SERPL CALC-SCNC: 13 MMOL/L (ref 7–16)
AST SERPL-CCNC: 23 U/L (ref 12–45)
BASOPHILS # BLD AUTO: 0.1 % (ref 0–1.8)
BASOPHILS # BLD: 0.02 K/UL (ref 0–0.12)
BILIRUB SERPL-MCNC: 0.4 MG/DL (ref 0.1–1.5)
BUN SERPL-MCNC: 24 MG/DL (ref 8–22)
CALCIUM ALBUM COR SERPL-MCNC: 9.3 MG/DL (ref 8.5–10.5)
CALCIUM SERPL-MCNC: 8.6 MG/DL (ref 8.5–10.5)
CHLORIDE SERPL-SCNC: 102 MMOL/L (ref 96–112)
CO2 SERPL-SCNC: 21 MMOL/L (ref 20–33)
CREAT SERPL-MCNC: 0.68 MG/DL (ref 0.5–1.4)
EOSINOPHIL # BLD AUTO: 0 K/UL (ref 0–0.51)
EOSINOPHIL NFR BLD: 0 % (ref 0–6.9)
ERYTHROCYTE [DISTWIDTH] IN BLOOD BY AUTOMATED COUNT: 40.6 FL (ref 35.9–50)
GFR SERPLBLD CREATININE-BSD FMLA CKD-EPI: 112 ML/MIN/1.73 M 2
GLOBULIN SER CALC-MCNC: 3.8 G/DL (ref 1.9–3.5)
GLUCOSE BLD STRIP.AUTO-MCNC: 136 MG/DL (ref 65–99)
GLUCOSE BLD STRIP.AUTO-MCNC: 144 MG/DL (ref 65–99)
GLUCOSE BLD STRIP.AUTO-MCNC: 174 MG/DL (ref 65–99)
GLUCOSE BLD STRIP.AUTO-MCNC: 240 MG/DL (ref 65–99)
GLUCOSE SERPL-MCNC: 187 MG/DL (ref 65–99)
HCT VFR BLD AUTO: 38.1 % (ref 42–52)
HGB BLD-MCNC: 12.5 G/DL (ref 14–18)
IMM GRANULOCYTES # BLD AUTO: 0.08 K/UL (ref 0–0.11)
IMM GRANULOCYTES NFR BLD AUTO: 0.6 % (ref 0–0.9)
LYMPHOCYTES # BLD AUTO: 1.08 K/UL (ref 1–4.8)
LYMPHOCYTES NFR BLD: 7.8 % (ref 22–41)
MCH RBC QN AUTO: 30.3 PG (ref 27–33)
MCHC RBC AUTO-ENTMCNC: 32.8 G/DL (ref 32.3–36.5)
MCV RBC AUTO: 92.3 FL (ref 81.4–97.8)
MONOCYTES # BLD AUTO: 0.38 K/UL (ref 0–0.85)
MONOCYTES NFR BLD AUTO: 2.8 % (ref 0–13.4)
NEUTROPHILS # BLD AUTO: 12.22 K/UL (ref 1.82–7.42)
NEUTROPHILS NFR BLD: 88.7 % (ref 44–72)
NRBC # BLD AUTO: 0 K/UL
NRBC BLD-RTO: 0 /100 WBC (ref 0–0.2)
PLATELET # BLD AUTO: 373 K/UL (ref 164–446)
PMV BLD AUTO: 8.9 FL (ref 9–12.9)
POTASSIUM SERPL-SCNC: 4.1 MMOL/L (ref 3.6–5.5)
PROT SERPL-MCNC: 6.9 G/DL (ref 6–8.2)
RBC # BLD AUTO: 4.13 M/UL (ref 4.7–6.1)
SODIUM SERPL-SCNC: 136 MMOL/L (ref 135–145)
WBC # BLD AUTO: 13.8 K/UL (ref 4.8–10.8)

## 2024-09-12 PROCEDURE — A9270 NON-COVERED ITEM OR SERVICE: HCPCS | Performed by: INTERNAL MEDICINE

## 2024-09-12 PROCEDURE — 85025 COMPLETE CBC W/AUTO DIFF WBC: CPT

## 2024-09-12 PROCEDURE — 700105 HCHG RX REV CODE 258: Performed by: INTERNAL MEDICINE

## 2024-09-12 PROCEDURE — 82962 GLUCOSE BLOOD TEST: CPT | Mod: 91

## 2024-09-12 PROCEDURE — 99233 SBSQ HOSP IP/OBS HIGH 50: CPT | Performed by: INTERNAL MEDICINE

## 2024-09-12 PROCEDURE — 700101 HCHG RX REV CODE 250: Performed by: INTERNAL MEDICINE

## 2024-09-12 PROCEDURE — 700111 HCHG RX REV CODE 636 W/ 250 OVERRIDE (IP): Mod: JZ | Performed by: INTERNAL MEDICINE

## 2024-09-12 PROCEDURE — 700102 HCHG RX REV CODE 250 W/ 637 OVERRIDE(OP): Performed by: INTERNAL MEDICINE

## 2024-09-12 PROCEDURE — 770006 HCHG ROOM/CARE - MED/SURG/GYN SEMI*

## 2024-09-12 PROCEDURE — 80053 COMPREHEN METABOLIC PANEL: CPT

## 2024-09-12 RX ORDER — MORPHINE SULFATE 4 MG/ML
4 INJECTION INTRAVENOUS ONCE
Status: COMPLETED | OUTPATIENT
Start: 2024-09-12 | End: 2024-09-12

## 2024-09-12 RX ORDER — SODIUM CHLORIDE, SODIUM LACTATE, POTASSIUM CHLORIDE, CALCIUM CHLORIDE 600; 310; 30; 20 MG/100ML; MG/100ML; MG/100ML; MG/100ML
INJECTION, SOLUTION INTRAVENOUS CONTINUOUS
Status: DISCONTINUED | OUTPATIENT
Start: 2024-09-12 | End: 2024-09-13 | Stop reason: HOSPADM

## 2024-09-12 RX ADMIN — AMPICILLIN AND SULBACTAM 3 G: 1; 2 INJECTION, POWDER, FOR SOLUTION INTRAMUSCULAR; INTRAVENOUS at 18:47

## 2024-09-12 RX ADMIN — OXYCODONE HYDROCHLORIDE 5 MG: 5 TABLET ORAL at 13:06

## 2024-09-12 RX ADMIN — DEXAMETHASONE SODIUM PHOSPHATE 4 MG: 4 INJECTION INTRA-ARTICULAR; INTRALESIONAL; INTRAMUSCULAR; INTRAVENOUS; SOFT TISSUE at 18:36

## 2024-09-12 RX ADMIN — AMPICILLIN AND SULBACTAM 3 G: 1; 2 INJECTION, POWDER, FOR SOLUTION INTRAMUSCULAR; INTRAVENOUS at 05:25

## 2024-09-12 RX ADMIN — FAMOTIDINE 20 MG: 20 TABLET, FILM COATED ORAL at 18:36

## 2024-09-12 RX ADMIN — ENOXAPARIN SODIUM 40 MG: 100 INJECTION SUBCUTANEOUS at 00:15

## 2024-09-12 RX ADMIN — MORPHINE SULFATE 2 MG: 4 INJECTION, SOLUTION INTRAMUSCULAR; INTRAVENOUS at 18:37

## 2024-09-12 RX ADMIN — LIDOCAINE HYDROCHLORIDE 5 ML: 20 SOLUTION ORAL; TOPICAL at 05:25

## 2024-09-12 RX ADMIN — ALBUTEROL SULFATE 2 PUFF: 90 INHALANT RESPIRATORY (INHALATION) at 06:27

## 2024-09-12 RX ADMIN — MORPHINE SULFATE 4 MG: 4 INJECTION, SOLUTION INTRAMUSCULAR; INTRAVENOUS at 00:28

## 2024-09-12 RX ADMIN — DEXAMETHASONE SODIUM PHOSPHATE 4 MG: 4 INJECTION INTRA-ARTICULAR; INTRALESIONAL; INTRAMUSCULAR; INTRAVENOUS; SOFT TISSUE at 05:24

## 2024-09-12 RX ADMIN — AMPICILLIN AND SULBACTAM 3 G: 1; 2 INJECTION, POWDER, FOR SOLUTION INTRAMUSCULAR; INTRAVENOUS at 23:26

## 2024-09-12 RX ADMIN — OXYCODONE HYDROCHLORIDE 5 MG: 5 TABLET ORAL at 05:34

## 2024-09-12 RX ADMIN — INSULIN HUMAN 2 UNITS: 100 INJECTION, SOLUTION PARENTERAL at 18:43

## 2024-09-12 RX ADMIN — OXYCODONE HYDROCHLORIDE 2.5 MG: 5 TABLET ORAL at 16:06

## 2024-09-12 RX ADMIN — AMPICILLIN AND SULBACTAM 3 G: 1; 2 INJECTION, POWDER, FOR SOLUTION INTRAMUSCULAR; INTRAVENOUS at 13:05

## 2024-09-12 RX ADMIN — DEXAMETHASONE SODIUM PHOSPHATE 4 MG: 4 INJECTION INTRA-ARTICULAR; INTRALESIONAL; INTRAMUSCULAR; INTRAVENOUS; SOFT TISSUE at 13:02

## 2024-09-12 RX ADMIN — FAMOTIDINE 20 MG: 20 TABLET, FILM COATED ORAL at 00:16

## 2024-09-12 RX ADMIN — ALBUTEROL SULFATE 2 PUFF: 90 INHALANT RESPIRATORY (INHALATION) at 19:37

## 2024-09-12 RX ADMIN — DEXAMETHASONE SODIUM PHOSPHATE 4 MG: 4 INJECTION INTRA-ARTICULAR; INTRALESIONAL; INTRAMUSCULAR; INTRAVENOUS; SOFT TISSUE at 00:15

## 2024-09-12 RX ADMIN — SODIUM CHLORIDE, POTASSIUM CHLORIDE, SODIUM LACTATE AND CALCIUM CHLORIDE: 600; 310; 30; 20 INJECTION, SOLUTION INTRAVENOUS at 15:47

## 2024-09-12 RX ADMIN — DEXAMETHASONE SODIUM PHOSPHATE 4 MG: 4 INJECTION INTRA-ARTICULAR; INTRALESIONAL; INTRAMUSCULAR; INTRAVENOUS; SOFT TISSUE at 23:21

## 2024-09-12 RX ADMIN — INSULIN HUMAN 1 UNITS: 100 INJECTION, SOLUTION PARENTERAL at 08:07

## 2024-09-12 RX ADMIN — POTASSIUM CHLORIDE AND SODIUM CHLORIDE: 900; 300 INJECTION, SOLUTION INTRAVENOUS at 00:21

## 2024-09-12 RX ADMIN — LIDOCAINE HYDROCHLORIDE 5 ML: 20 SOLUTION ORAL; TOPICAL at 18:44

## 2024-09-12 RX ADMIN — ENOXAPARIN SODIUM 40 MG: 100 INJECTION SUBCUTANEOUS at 18:35

## 2024-09-12 RX ADMIN — FAMOTIDINE 20 MG: 20 TABLET, FILM COATED ORAL at 05:24

## 2024-09-12 RX ADMIN — AMPICILLIN AND SULBACTAM 3 G: 1; 2 INJECTION, POWDER, FOR SOLUTION INTRAMUSCULAR; INTRAVENOUS at 00:21

## 2024-09-12 ASSESSMENT — PAIN DESCRIPTION - PAIN TYPE
TYPE: ACUTE PAIN

## 2024-09-12 ASSESSMENT — COGNITIVE AND FUNCTIONAL STATUS - GENERAL
MOBILITY SCORE: 24
SUGGESTED CMS G CODE MODIFIER MOBILITY: CH
SUGGESTED CMS G CODE MODIFIER DAILY ACTIVITY: CH
DAILY ACTIVITIY SCORE: 24

## 2024-09-12 ASSESSMENT — LIFESTYLE VARIABLES
ALCOHOL_USE: YES
HAVE PEOPLE ANNOYED YOU BY CRITICIZING YOUR DRINKING: NO
HAVE YOU EVER FELT YOU SHOULD CUT DOWN ON YOUR DRINKING: NO
TOTAL SCORE: 0
AVERAGE NUMBER OF DAYS PER WEEK YOU HAVE A DRINK CONTAINING ALCOHOL: 5
ON A TYPICAL DAY WHEN YOU DRINK ALCOHOL HOW MANY DRINKS DO YOU HAVE: 1
HOW MANY TIMES IN THE PAST YEAR HAVE YOU HAD 5 OR MORE DRINKS IN A DAY: 1
DOES PATIENT WANT TO STOP DRINKING: NO
TOTAL SCORE: 0
CONSUMPTION TOTAL: POSITIVE
EVER HAD A DRINK FIRST THING IN THE MORNING TO STEADY YOUR NERVES TO GET RID OF A HANGOVER: NO
TOTAL SCORE: 0
EVER FELT BAD OR GUILTY ABOUT YOUR DRINKING: NO

## 2024-09-12 ASSESSMENT — ENCOUNTER SYMPTOMS
SORE THROAT: 1
MYALGIAS: 1

## 2024-09-12 ASSESSMENT — PATIENT HEALTH QUESTIONNAIRE - PHQ9
SUM OF ALL RESPONSES TO PHQ9 QUESTIONS 1 AND 2: 0
2. FEELING DOWN, DEPRESSED, IRRITABLE, OR HOPELESS: NOT AT ALL
1. LITTLE INTEREST OR PLEASURE IN DOING THINGS: NOT AT ALL
SUM OF ALL RESPONSES TO PHQ9 QUESTIONS 1 AND 2: 0
1. LITTLE INTEREST OR PLEASURE IN DOING THINGS: NOT AT ALL
2. FEELING DOWN, DEPRESSED, IRRITABLE, OR HOPELESS: NOT AT ALL

## 2024-09-12 ASSESSMENT — FIBROSIS 4 INDEX: FIB4 SCORE: 0.41

## 2024-09-12 NOTE — ED NOTES
Medication history reviewed with patient at bedside.   Med rec is complete  Allergies reviewed.     Patient was prescribed a 7 day course of Clindamycin 300mg tid on 9/9/24- last dose 9/11/24 1200 per patient.    Anticoagulants: No    Rafael Bryan

## 2024-09-12 NOTE — ED NOTES
Pt ambulated to green 25 from lobby with steady gait.  On monitor, call light in reach. Chart up for ERP.

## 2024-09-12 NOTE — CONSULTS
DATE OF SERVICE:  09/12/2024     PRINCIPAL COMPLAINT:  Swelling of the right side of the face and neck.     HISTORY OF PRESENT ILLNESS:  This is a 54-year-old male who states that he has   had years of problems with known salivary stone.  He states that it has been   swollen for about 4 weeks this time and got to the point where he is having   difficulty swallowing, so he was seen at [facility].  CT scan was   done there that showed swelling of the right submandibular gland with a large   stone measuring approximately 1.5 cm in size.     PAST MEDICAL HISTORY:  He has a history of asthma, dyslipidemia and reflux.     PAST SURGICAL HISTORY:  He has had hiatal hernia repair.     MEDICATIONS:  He currently takes albuterol.  He was given clindamycin at [place] and ibuprofen.     PHYSICAL EXAMINATION:  GENERAL:  On initial exam, he is lying in the bed, in no distress.  HEENT:  Both ears are grossly normal.  The nose is patent.  The mouth is pink,   moist.  The floor of the mouth is not markedly swollen, but very tender to   palpation and then feels something firm posteriorly along the floor of the   mouth.  NECK:  Soft.  His right submandibular gland is markedly swollen to about 5   cm, it is very tender, I am not able to express any debris out of the duct,   but he says he got some out this morning.  He does have some enlarged lymph   nodes also noted in the neck.     DIAGNOSTIC DATA:  I did review the CAT scan personally, which showed some   cervical lymphadenopathy. The right submandibular gland was markedly swollen   as mentioned and he has calculi, which looks like it is most likely in the   hilum, looks about a 1.5 to 2 cm in size.     IMPRESSION: Sialolithiasis with obstruction of the right submandibular gland.    He will likely need this gland out as there was no way to get to that stone   other than going through the neck and removing the gland.  I do not recommend   that it be done when he is  actively infected.  He actually is markedly better   just from yesterday.  He states he is swallowing better and I can tell the   swelling is down. I would recommend 24 hours of IV antibiotics with steroids   and then he can be home on oral Augmentin.  I would recommended it for 2   weeks.  I would like to see him next week in the office to evaluate timing for   removal of the gland.  He understands this.  All his questions were answered.    If you have any questions or concerns, please give me a call.        ______________________________  MD DENYS Singletary/DEIDRA/SULEMA    DD:  09/12/2024 08:05  DT:  09/12/2024 08:42    Job#:  [id number]

## 2024-09-12 NOTE — ED PROVIDER NOTES
ED Provider Note    CHIEF COMPLAINT  Chief Complaint   Patient presents with    Facial Swelling     Pt was at [place] for swollen throat and lower right sided facial swelling X 4 weeks. + fever/chills. CT showed infected submandibular salivary gland.  Pt protecting airway.        EXTERNAL RECORDS REVIEWED  Patient's last encounter was a family medicine clinic visit he was seen for right elbow pain, chronic pain of both knees, GERD, dyslipidemia and low vitamin D levels.    HPI/ROS  LIMITATION TO HISTORY   Select: : None  OUTSIDE HISTORIAN(S):  Transferring ERP    Shelton Milton is a 54 y.o. male who presents to the emergency department via transfer from [facility].  I spoke with the transferring ERP.  Patient has had subjective fever and chills for the last 1 to 2 days.  He has dealt with salivary gland stones for years now.  He has been able to milk them and get them to express out but recently, he has had swelling to the area under his jaw on the right that will not resolve.  He can feel fullness and pressure.  He was seen by dentist recently who put him on clindamycin which she has been taking as directed but he has not noticed any improvement.  He presented to [facility] tonight, was found to have an elevated white blood cell count, 15.  CT showed a large soft tissue density mass in the right lateral pharyngeal wall and submandibular region.  The mass measures 5 cm x 4-1/2 cm.  In the center, is a large calcification measuring 1.7 cm.  The mass envelopes the submandibular gland or possibly, represents the submandibular gland.  The oropharyngeal and hypopharyngeal subglottic airway are impinged upon and displaced to the left.  The larynx is mildly displaced to the left the epiglottis is unremarkable,  As is the trachea.    PAST MEDICAL HISTORY   has a past medical history of Asthma, Dyslipidemia (3/2/2018), and GERD (gastroesophageal reflux disease).    SURGICAL HISTORY   has a past surgical history  "that includes other abdominal surgery and hiatal hernia repair.    FAMILY HISTORY  Family History   Problem Relation Age of Onset    Diabetes Mother     Diabetes Father     Hypertension Father     Other Father         gout    Stroke Sister     Diabetes Paternal Uncle     Cancer Paternal Uncle         bladder       SOCIAL HISTORY  Social History     Tobacco Use    Smoking status: Every Day     Current packs/day: 0.25     Average packs/day: 0.3 packs/day for 4.0 years (1.0 ttl pk-yrs)     Types: Cigarettes    Smokeless tobacco: Never   Substance and Sexual Activity    Alcohol use: Yes     Comment: monthly    Drug use: Yes     Types: Marijuana     Comment: daily    Sexual activity: Yes     Partners: Female       CURRENT MEDICATIONS  Home Medications       Reviewed by Rafael Bryan (Pharmacy Tech) on 09/11/24 at 2228  Med List Status: Complete     Medication Last Dose Status   albuterol 108 (90 Base) MCG/ACT Aero Soln inhalation aerosol prn Active   clindamycin (CLEOCIN) 300 MG Cap 9/11/2024 Active   ibuprofen (MOTRIN) 200 MG Tab 9/11/2024 Active                  Audit from Redirected Encounters    **Home medications have not yet been reviewed for this encounter**         ALLERGIES  Allergies   Allergen Reactions    Codeine Nausea       PHYSICAL EXAM  VITAL SIGNS: BP (!) 148/84   Pulse 91   Temp 37.2 °C (98.9 °F) (Oral)   Resp 18   Ht 1.727 m (5' 8\")   Wt 86.2 kg (190 lb 0.6 oz)   SpO2 96%   BMI 28.89 kg/m²    Vitals reviewed.  Constitutional: Patient is oriented to person, place, and time. Appears well-developed and well-nourished. No distress.    Head: Normocephalic and atraumatic.   Mouth/Throat: Oropharynx is clear and moist, no exudates.  There is no swelling to the floor of the mouth.  There is a right submandibular, firm, mass.  Eyes: Conjunctivae are normal.   Neck: Normal range of motion. Neck supple.  Cardiovascular: Normal rate, regular rhythm and normal heart sounds.   Pulmonary/Chest: Effort " normal and breath sounds normal. No respiratory distress, no wheezes, rhonchi, or rales.   Abdominal: Soft. Bowel sounds are normal. There is no tenderness  Musculoskeletal: No edema.  Lymphadenopathy: No cervical adenopathy.   Neurological: Normal motor and sensory exam. No focal deficits.   Skin: Skin is warm and dry. No erythema. No pallor.   Psychiatric: Patient has a normal mood and affect.     EKG/LABS  Results for orders placed or performed during the hospital encounter of 09/11/24   Lactic Acid   Result Value Ref Range    Lactic Acid 1.2 0.5 - 2.0 mmol/L   Complete Metabolic Panel   Result Value Ref Range    Sodium 135 135 - 145 mmol/L    Potassium 3.4 (L) 3.6 - 5.5 mmol/L    Chloride 98 96 - 112 mmol/L    Co2 20 20 - 33 mmol/L    Anion Gap 17.0 (H) 7.0 - 16.0    Glucose 152 (H) 65 - 99 mg/dL    Bun 21 8 - 22 mg/dL    Creatinine 0.73 0.50 - 1.40 mg/dL    Calcium 8.5 8.5 - 10.5 mg/dL    Correct Calcium 8.7 8.5 - 10.5 mg/dL    AST(SGOT) 20 12 - 45 U/L    ALT(SGPT) 45 2 - 50 U/L    Alkaline Phosphatase 148 (H) 30 - 99 U/L    Total Bilirubin 0.6 0.1 - 1.5 mg/dL    Albumin 3.7 3.2 - 4.9 g/dL    Total Protein 7.5 6.0 - 8.2 g/dL    Globulin 3.8 (H) 1.9 - 3.5 g/dL    A-G Ratio 1.0 g/dL   ESTIMATED GFR   Result Value Ref Range    GFR (CKD-EPI) 109 >60 mL/min/1.73 m 2       RADIOLOGY/PROCEDURES   I have independently interpreted the diagnostic imaging associated with this visit and am waiting the final reading from the radiologist.   My preliminary interpretation is as follows: NAPD    Radiologist interpretation:  DX-CHEST-PORTABLE (1 VIEW)   Final Result      No acute cardiopulmonary disease evident.      OUTSIDE IMAGES-CT NECK   Final Result          COURSE & MEDICAL DECISION MAKING    ASSESSMENT, COURSE AND PLAN  Care Narrative:     This is a pleasant 54-year-old male.  He presents via transfer from [facility] for ENT services.  He has a sizable, 5 x 4-1/2 cm right sided submandibular mass which likely appears  infected.  He has had a fever and has an elevated white blood cell count.  He has been dealing with similar type symptoms for years but in the past, he has been able to express stones on his own.  This stone is quite large, measuring 1.7 cm.  He has some displacement of his oral pharyngeal and hypopharyngeal structures to the left.  I have placed a call to our ENT on-call, Dr. Terrell.    9:54 PM D/W Dr. Terrell, she was able to review the patient's imaging.  She agrees with plan for hospital admission continued antibiotics.  Patient received Unasyn at the [facility].  Will admit patient to the hospitalist.  Ideally, infection would be treated prior to any intervention.  Based on her evaluation of the CT, done at [facility], he likely will need the entire gland removed and this is best done when the inflammation and infection has been treated. She will see patient tomorrow.  Will contact the hospitalist for admission.  Patient triggered sepsis criteria but he is not hypotensive, mildly tachycardic 101.  He is not tachypneic his oxygen saturation is reassuring.  He is able to converse easily he does not appear to be in any distress.    10:50 PM D/W Hospitalist, regarding patient's presentation, workup at the [facility].  Treatment prior to arrival including Unasyn and dexamethasone.  Relayed to him, my discussion with ENT, to continue Unasyn and dexamethasone.  No plan for surgical intervention tomorrow.  Ideally, this would be removed after the swelling, inflammation and infection are improved.  Patient is updated on plan of care.    ADDITIONAL PROBLEMS MANAGED    DISPOSITION AND DISCUSSIONS  I have discussed management of the patient with the following physicians and TIEN's: Hospitalist, Dr. Terrell, ENT    Discussion of management with other [facility] or appropriate source(s): None     Escalation of care considered, and ultimately not performed: None    Barriers to care at this time, including but  not limited to: None.     FINAL DIAGNOSIS  1. Stone of salivary gland or duct    2. Salivary gland infection         Electronically signed by: Lupe Zeng D.O., 9/11/2024 9:30 PM

## 2024-09-12 NOTE — ASSESSMENT & PLAN NOTE
9/12/2024   54-year-old male with 4 weeks of right-sided facial and upper neck swelling and pain and CT of the soft tissue of the neck consistent with right submandibular gland inflammation with large stone inside with impingement upon airways, but clinically without airway compromise   WBC 15, tachycardia 101  Plan: Continue Unasyn, Decadron 4 mg every 6 hours  Monitor airways, pulse ox  Ibuprofen as needed  Full liquid diet for now  No plan for surgical intervention  Will follow with ENT recommendations

## 2024-09-12 NOTE — PROGRESS NOTES
"New admit from ER per maite. Received alert and oriented x 4. Full liquid. Check vitals sign and recorded accordingly and due med given per MAR. Monitor sign and symptoms of respiratory distress and treatment given accordingly per MAR.Medicated per MAR and reassessed every 2 hours per protocol. Call light within reach. Steady on his feet.  Needs attended. Continue to monitor./78   Pulse 81   Temp 36.6 °C (97.8 °F) (Temporal)   Resp 17   Ht 1.727 m (5' 8\")   Wt 86.2 kg (190 lb)   SpO2 94%   BMI 28.89 kg/m² .  "

## 2024-09-12 NOTE — DISCHARGE PLANNING
Care Transition Team Assessment    SW met with patient at bedside. Patient is AAOX4.  Patient verified accuracy of demographic information in Face Sheet.  Patient lives alone. Patient reports his only living relative is Stacie, his mother [phone].  Patient is uninsured; referred to [facility] for Screening.  Patient is not established with PCP; patient reports he will establish with the PCP of his choice.  Patient drives. Patient reports he does not own any DME.  Based on information provided by patient, the anticipated discharge disposition is Home with O/P Services.    Information Source  Orientation Level: Oriented X4  Information Given By: Patient  Informant's Name: Shelton  Who is responsible for making decisions for patient? : Patient    Readmission Evaluation  Is this a readmission?: No    Elopement Risk  Legal Hold: No  Ambulatory or Self Mobile in Wheelchair: Yes  Disoriented: No    Interdisciplinary Discharge Planning  Lives with - Patient's Self Care Capacity: Adult Children  Patient or legal guardian wants to designate a caregiver: No  Support Systems: None  Housing / Facility: [address]    Discharge Preparedness  What is your plan after discharge?: Home with help  What are your discharge supports?: Parent  Prior Functional Level: Independent with Activities of Daily Living, Independent with Medication Management    Functional Assesment  Prior Functional Level: Independent with Activities of Daily Living, Independent with Medication Management    Finances  Financial Barriers to Discharge: No    Vision / Hearing Impairment  Vision Impairment : Yes  Right Eye Vision: Wears Glasses, Impaired  Left Eye Vision: Wears Glasses, Impaired  Hearing Impairment : No         Advance Directive  Advance Directive?: None  Advance Directive offered?: AD Booklet refused    Domestic Abuse  Have you ever been the victim of abuse or violence?: No  Possible Abuse/Neglect Reported to:: Not Applicable    Psychological  Assessment  History of Substance Abuse: None  History of Psychiatric Problems: No    Discharge Risks or Barriers  Discharge risks or barriers?: Uninsured / underinsured  Patient risk factors: Uninsured or underinsured    Anticipated Discharge Information  Discharge Disposition: Discharged to home/self care (01)  Discharge Address: [address] [place] [address] [address]  Discharge Contact Phone Number: mother Quinones [phone]

## 2024-09-12 NOTE — CARE PLAN
The patient is Stable - Low risk of patient condition declining or worsening    Shift Goals  Clinical Goals: pain control and hydration  Patient Goals: sleep and pain free    Progress made toward(s) clinical / shift goals:  due med given for pain and rested after medicated.Continued ivf  for hydration.    Patient is not progressing towards the following goals:

## 2024-09-12 NOTE — HOSPITAL COURSE
Shelton Milton is a 54 y.o. male with history of asthma, GERD, obstructive sleep apnea, dyslipidemia who presented 9/11/2024 with complaints of right-sided facial swelling and pain for 4 weeks.  He states that he had similar episode 4 years ago.  At that time he took a course of antibiotics and symptoms subsided.  He was seen at [facility] ER earlier today and was evaluated with CT scanning of the neck soft tissue without contrast.  Impression:  large soft tissue mass in the right submandibular region which arises from right submandibular gland and contains a large central calculus, impinging upon the oropharyngeal and hypopharyngeal airways.  Mildly enlarged cervical lymph nodes.  Patient was given Unasyn and IV Decadron 8 mg and was transferred here for ENT evaluation.  Dr. Zeng/ERP consulted with Dr. Terrell/ENT who recommended IV antibiotics and steroids.  No plan for interventions for now.  Tachycardia 101 and WBC elevation 15k noted, but lactic acid is not elevated.  Of note, he was started on clindamycin p.o. on 9/9.    Pain and swelling significant improved with IV dexamethasone and Unasyn.  PT recommended 24 hours of IV steroids and antibiotics.  Symptomology significantly improved.  Patient tolerated soft diet with improved chewing and swallowing.  Facial pain and swelling were much improved.  Patient will be discharged on 2 weeks of Augmentin.  Plan for resection of salivary gland as outpatient    Medically stable to discharge home.  Follow-up with primary care and ENT as outpatient.

## 2024-09-12 NOTE — PROGRESS NOTES
4 Eyes Skin Assessment Completed by Kiki BALDWIN , RN and  Laura Brandt, SWATHI.    Head right face swelling  Ears WDL  Nose WDL  Mouth WDL  Neck WDL  Breast/Chest WDL  Shoulder Blades WDL  Spine WDL  (R) Arm/Elbow/Hand WDL  (L) Arm/Elbow/Hand WDL  Abdomen WDL  Groin WDL  Scrotum/Coccyx/Buttocks WDL  (R) Leg WDL  (L) Leg WDL  (R) Heel/Foot/Toe WDL  (L) Heel/Foot/Toe WDL          Devices In Places Pulse Ox      Interventions In Place Pressure Redistribution Mattress    Possible Skin Injury No    Pictures Uploaded Into Epic N/A  Wound Consult Placed N/A  RN Wound Prevention Protocol Ordered No

## 2024-09-12 NOTE — H&P
Hospital Medicine History & Physical Note    Date of Service  9/11/2024    Primary Care Physician  Mita Noriega D.O. (Inactive)    Code Status  Full Code    Chief Complaint  Chief Complaint   Patient presents with    Facial Swelling     Pt was at [place] for swollen throat and lower right sided facial swelling X 4 weeks. + fever/chills. CT showed infected submandibular salivary gland.  Pt protecting airway.        History of Presenting Illness  Shelton Milton is a 54 y.o. male with history of asthma, GERD, obstructive sleep apnea, dyslipidemia who presented 9/11/2024 with complaints of right-sided facial swelling and pain for 4 weeks.  He states that he had similar episode 4 years ago.  At that time he took a course of antibiotics and symptoms subsided.  He was seen at [facility] ER earlier today and was evaluated with CT scanning of the neck soft tissue without contrast.  Impression:  large soft tissue mass in the right submandibular region which arises from right submandibular gland and contains a large central calculus, impinging upon the oropharyngeal and hypopharyngeal airways.  Mildly enlarged cervical lymph nodes.  Patient was given Unasyn and IV Decadron 8 mg and was transferred here for ENT evaluation.  Dr. Zeng/ERP consulted with Dr. Terrell/ENT who recommended IV antibiotics and steroids.  No plan for interventions for now.  Tachycardia 101 and WBC elevation 15k noted, but lactic acid is not elevated.  Of note, he was started on clindamycin p.o. on 9/9.    I discussed the plan of care with patient and ERP .    Review of Systems  Review of Systems   Constitutional:  Negative for chills, fever and weight loss.   HENT:  Negative for ear pain, hearing loss and tinnitus.         Right-sided facial swelling and pain   Eyes:  Negative for blurred vision, double vision and photophobia.   Respiratory:  Negative for cough, hemoptysis and sputum production.    Cardiovascular:  Negative for chest  pain, palpitations and orthopnea.   Gastrointestinal:  Negative for heartburn, nausea and vomiting.   Genitourinary:  Negative for dysuria, flank pain, frequency and hematuria.   Musculoskeletal:  Negative for back pain, joint pain and neck pain.   Skin:  Negative for itching and rash.   Neurological:  Negative for tremors, speech change, focal weakness and headaches.   Endo/Heme/Allergies:  Negative for environmental allergies and polydipsia. Does not bruise/bleed easily.   Psychiatric/Behavioral:  Negative for hallucinations and substance abuse. The patient is not nervous/anxious.        Past Medical History   has a past medical history of Asthma, Dyslipidemia (3/2/2018), and GERD (gastroesophageal reflux disease).    Surgical History   has a past surgical history that includes other abdominal surgery and hiatal hernia repair.     Family History  family history includes Cancer in his paternal uncle; Diabetes in his father, mother, and paternal uncle; Hypertension in his father; Other in his father; Stroke in his sister.   Family history reviewed with patient. There is no family history that is pertinent to the chief complaint.     Social History   reports that he has been smoking cigarettes. He has a 1 pack-year smoking history. He has never used smokeless tobacco. He reports current alcohol use. He reports current drug use. Drug: Marijuana.    Allergies  Allergies   Allergen Reactions    Codeine Nausea       Medications  Prior to Admission Medications   Prescriptions Last Dose Informant Patient Reported? Taking?   albuterol 108 (90 Base) MCG/ACT Aero Soln inhalation aerosol prn at unk Patient No No   Sig: Inhale 2 Puffs by mouth every 6 hours as needed for Shortness of Breath.   clindamycin (CLEOCIN) 300 MG Cap 9/11/2024 at 1200 Patient Yes Yes   Sig: Take 300 mg by mouth 3 times a day.   ibuprofen (MOTRIN) 200 MG Tab 9/11/2024 at am Patient Yes Yes   Sig: Take 600 mg by mouth every 3 hours as needed for Mild  Pain. 3 tablets= 600mg      Facility-Administered Medications: None       Physical Exam  Temp:  [37.2 °C (98.9 °F)] 37.2 °C (98.9 °F)  Pulse:  [] 91  Resp:  [18] 18  BP: (116-148)/(80-84) 148/84  SpO2:  [96 %-97 %] 96 %  Blood Pressure: (!) 148/84   Temperature: 37.2 °C (98.9 °F)   Pulse: 91   Respiration: 18   Pulse Oximetry: 96 %       Physical Exam  Vitals and nursing note reviewed.   Constitutional:       General: He is not in acute distress.     Appearance: Normal appearance.   HENT:      Head: Normocephalic and atraumatic.      Nose: Nose normal.      Mouth/Throat:      Mouth: Mucous membranes are moist.   Eyes:      Extraocular Movements: Extraocular movements intact.      Pupils: Pupils are equal, round, and reactive to light.   Neck:      Comments: Right-sided facial and neck swelling and tenderness  Cardiovascular:      Rate and Rhythm: Normal rate and regular rhythm.   Pulmonary:      Effort: Pulmonary effort is normal.      Breath sounds: Normal breath sounds.   Abdominal:      General: Abdomen is flat. There is no distension.      Tenderness: There is no abdominal tenderness.   Musculoskeletal:         General: No swelling or deformity. Normal range of motion.      Cervical back: Normal range of motion and neck supple.   Skin:     General: Skin is warm and dry.   Neurological:      General: No focal deficit present.      Mental Status: He is alert and oriented to person, place, and time.   Psychiatric:         Mood and Affect: Mood normal.         Behavior: Behavior normal.         Laboratory:  Recent Labs     09/11/24  1616   WBC 15.70*   RBC 4.24*   HEMOGLOBIN 12.8*   HEMATOCRIT 38.0*   MCV 89.7   MCH 30.3   MCHC 33.8   RDW 12.8   PLATELETCT 377   MPV 7.0*     Recent Labs     09/11/24  1616 09/11/24 2023   SODIUM 135* 135   POTASSIUM 3.4* 3.4*   CHLORIDE 104 98   CO2 22.0 20   GLUCOSE 143* 152*   BUN 20.0 21   CREATININE 0.70 0.73   CALCIUM 9.2 8.5     Recent Labs     09/11/24  1616  "09/11/24 2023   ALTSGPT  --  45   ASTSGOT  --  20   ALKPHOSPHAT  --  148*   TBILIRUBIN  --  0.6   GLUCOSE 143* 152*         No results for input(s): \"NTPROBNP\" in the last 72 hours.      No results for input(s): \"TROPONINT\" in the last 72 hours.    Imaging:  DX-CHEST-PORTABLE (1 VIEW)   Final Result      No acute cardiopulmonary disease evident.      OUTSIDE IMAGES-CT NECK   Final Result          X-Ray:  I have personally reviewed the images and compared with prior images.    Assessment/Plan:  Justification for Admission Status  I anticipate this patient will require at least two midnights for appropriate medical management, necessitating inpatient admission because … sialoadenitis    Patient will need a Med/Surg bed on MEDICAL service .  The need is secondary to sialoadenitis.    * Sialoadenitis- (present on admission)  Assessment & Plan  54-year-old male with 4 weeks of right-sided facial and upper neck swelling and pain and CT of the soft tissue of the neck consistent with right submandibular gland inflammation with large stone inside with impingement upon airways, but clinically without airway compromise   WBC 15, tachycardia 101  Plan: Continue Unasyn, Decadron 4 mg every 6 hours  Monitor airways, pulse ox  Ibuprofen as needed  Full liquid diet for now  No plan for surgical intervention  Will follow with ENT recommendations    Hyperglycemia  Assessment & Plan  Blood sugar 143  Ordered insulin sliding scale, A1c.    Hypokalemia  Assessment & Plan  Potassium 3.4  Replacement IV ordered    Asthma  Assessment & Plan  Not in exacerbation  Albuterol as needed    Gastroesophageal reflux disease without esophagitis- (present on admission)  Assessment & Plan  Pepcid        VTE prophylaxis: enoxaparin ppx  "

## 2024-09-12 NOTE — PROGRESS NOTES
Hospital Medicine Daily Progress Note    Date of Service  9/12/2024    Chief Complaint  Shelton Milton is a 54 y.o. male admitted 9/11/2024 with   Chief Complaint   Patient presents with    Facial Swelling     Pt was at [place] for swollen throat and lower right sided facial swelling X 4 weeks. + fever/chills. CT showed infected submandibular salivary gland.  Pt protecting airway.          Hospital Course  No notes on file    Interval Problem Update  Patient was seen and examined at bedside.  I have personally reviewed and interpreted vitals, labs, and imaging.    9/12.  Afebrile.  Tachycardia is improved.  On room air.  Denies fevers, chills, chest pains, shortness of breath.  Right-sided facial pain and swelling improved.  Tolerating full liquid diet.  ENT recommends continuing IV steroids and antibiotics for 24 hours after which she can be switched to oral and follow-up outpatient for gland removal.  Wbc 15.7 > 13.8  Hgb 12.8 > 12.5  A1c 6.2    I have discussed this patient's plan of care and discharge plan at IDT rounds today with Case Management, Nursing, Nursing leadership, and other members of the IDT team.    Consultants/Specialty  ENT    Code Status  Full Code    Disposition  The patient is not medically cleared for discharge to home or a post-acute facility.  Anticipate discharge to: home with organized home healthcare and close outpatient follow-up    I have placed the appropriate orders for post-discharge needs.    Review of Systems  Review of Systems   HENT:  Positive for sore throat.    Musculoskeletal:  Positive for myalgias.        Physical Exam  Temp:  [36.1 °C (97 °F)-37.2 °C (98.9 °F)] 36.1 °C (97 °F)  Pulse:  [] 77  Resp:  [17-18] 17  BP: (108-148)/(62-84) 108/71  SpO2:  [94 %-97 %] 95 %    Physical Exam  Vitals and nursing note reviewed.   Constitutional:       Appearance: Normal appearance. He is ill-appearing.   HENT:      Head: Normocephalic and atraumatic.      Nose: Nose normal.       Mouth/Throat:      Mouth: Mucous membranes are moist.      Pharynx: Oropharynx is clear.   Eyes:      Extraocular Movements: Extraocular movements intact.      Conjunctiva/sclera: Conjunctivae normal.   Cardiovascular:      Rate and Rhythm: Regular rhythm. Tachycardia present.      Pulses: Normal pulses.      Heart sounds: Normal heart sounds. No murmur heard.     No friction rub. No gallop.   Pulmonary:      Effort: Pulmonary effort is normal. No respiratory distress.      Breath sounds: Normal breath sounds. No wheezing or rales.   Chest:      Chest wall: No tenderness.   Abdominal:      General: Abdomen is flat. Bowel sounds are normal. There is no distension.      Palpations: Abdomen is soft. There is no mass.      Tenderness: There is no abdominal tenderness. There is no guarding.   Musculoskeletal:         General: Normal range of motion.      Cervical back: Normal range of motion and neck supple.   Skin:     General: Skin is warm.      Capillary Refill: Capillary refill takes less than 2 seconds.   Neurological:      General: No focal deficit present.      Mental Status: He is alert and oriented to person, place, and time. Mental status is at baseline.      Cranial Nerves: No cranial nerve deficit.      Motor: No weakness.   Psychiatric:         Mood and Affect: Mood normal.         Behavior: Behavior normal.         Fluids  No intake or output data in the 24 hours ending 09/12/24 0750     Laboratory  Recent Labs     09/11/24  1616 09/12/24  0536   WBC 15.70* 13.8*   RBC 4.24* 4.13*   HEMOGLOBIN 12.8* 12.5*   HEMATOCRIT 38.0* 38.1*   MCV 89.7 92.3   MCH 30.3 30.3   MCHC 33.8 32.8   RDW 12.8 40.6   PLATELETCT 377 373   MPV 7.0* 8.9*     Recent Labs     09/11/24  1616 09/11/24 2023 09/12/24  0536   SODIUM 135* 135 136   POTASSIUM 3.4* 3.4* 4.1   CHLORIDE 104 98 102   CO2 22.0 20 21   GLUCOSE 143* 152* 187*   BUN 20.0 21 24*   CREATININE 0.70 0.73 0.68   CALCIUM 9.2 8.5 8.6                    Imaging  DX-CHEST-PORTABLE (1 VIEW)   Final Result      No acute cardiopulmonary disease evident.      OUTSIDE IMAGES-CT NECK   Final Result           Assessment/Plan  * Sialoadenitis- (present on admission)  Assessment & Plan  9/12/2024   54-year-old male with 4 weeks of right-sided facial and upper neck swelling and pain and CT of the soft tissue of the neck consistent with right submandibular gland inflammation with large stone inside with impingement upon airways, but clinically without airway compromise   WBC 15, tachycardia 101  Plan: Continue Unasyn, Decadron 4 mg every 6 hours  Monitor airways, pulse ox  Ibuprofen as needed  Full liquid diet for now  No plan for surgical intervention  Will follow with ENT recommendations    Hyperglycemia  Assessment & Plan  9/12/2024  Blood sugar 143  Ordered insulin sliding scale, A1c.    Hypokalemia  Assessment & Plan  9/12/2024  Potassium 3.4  Replacement IV ordered    Asthma  Assessment & Plan  9/12/2024  Not in exacerbation  Albuterol as needed    Gastroesophageal reflux disease without esophagitis- (present on admission)  Assessment & Plan  9/12/2024  Pepcid         VTE prophylaxis: Lovenox    I have performed a physical exam and reviewed and updated ROS and Plan today (9/12/2024). In review of yesterday's note (9/11/2024), there are no changes except as documented above.    Greater than 51 minutes spent prepping to see patient (e.g. review of tests) obtaining and/or reviewing separately obtained history. Performing a medically appropriate examination and/ evaluation.  Counseling and educating the patient/family/caregiver.  Ordering medications, tests, or procedures.  Referring and communicating with other health care professionals.  Documenting clinical information in EPIC.  Independently interpreting results and communicating results to patient/family/caregiver.  Care coordination.

## 2024-09-12 NOTE — ED TRIAGE NOTES
"Chief Complaint   Patient presents with    Facial Swelling     Pt was at [place] for swollen throat and lower right sided facial swelling X 4 weeks. + fever/chills. CT showed infected submandibular salivary gland.  Pt protecting airway.        Pt ambulatory to triage. Pt A&Ox4.     Pt to phleb area . Pt educated on alerting staff in changes to condition. Pt verbalized understanding. Protocol ordered.     /80   Pulse (!) 101   Temp 37.2 °C (98.9 °F) (Oral)   Resp 18   Ht 1.727 m (5' 8\")   Wt 86.2 kg (190 lb 0.6 oz)   SpO2 97%   BMI 28.89 kg/m²    "

## 2024-09-12 NOTE — CONSULTS
DATE OF SERVICE:  09/12/2024     PRINCIPAL COMPLAINT:  Swelling of the right jaw.     HISTORY OF PRESENT ILLNESS:  This is a 54-year-old male who was seen at an   outside hospital with concern of 4 weeks of swelling.  He states this got so   bad that he was having some issues swallowing.  Therefore, went to the   hospital.  He was seen at [facility] where a CAT scan was done that showed a   mass in the submandibular region with a 2 cm calcification consistent with a   salivary stone.  He states that he has actually known he has had that stone   for years, but more recently has been bothering him.  He did recently was   working outside and feels that he may have gotten dehydrated that precipitated   into this becoming a problem.     PAST MEDICAL HISTORY:  He does have dyslipidemia, asthma and reflux.  He has   had an inguinal hernia repair.  He does smoke cigarettes.     MEDICATIONS:  Include albuterol, ibuprofen.  He was given clindamycin from   outpatient in a concern of dental issue.     PHYSICAL EXAMINATION:  GENERAL:  He is lying in the bed, in no distress.  VITAL SIGNS:  Stable with a temperature of 36.1, pulse of 77, blood pressure   108/73.  He is saturating well on room air.  HEENT:  Both ears are clear.  The eardrums intact.  No erythema or effusion.    The nose is pink, moist, patent.  The mouth is pink, moist.  He does not have   any swelling of the floor of mouth, but tenderness, especially posteriorly   where he could feel a firm mass.  He has very markedly enlarged right   submandibular gland that is extremely tender.  He does also have some right   enlarged lymph nodes.     DIAGNOSTIC DATA:  I did review the CT scan myself which shows a block   submandibular gland with a large 2 cm stone, most likely at the hilum.     IMPRESSION:  Sialolithiasis.  This stone, to remove this would need to be   removed through the neck as it is too big to even attempt floor of the mouth.    It would be best though if  his infection is decreased and is not inflamed.    This is not the time to remove when he was actively infected.  He has noticed   improvement just in the last day of antibiotics.  I would recommend at least   24 hours of IV antibiotics and home on Augmentin for 2 weeks.  He will follow   up with me as outpatient next week and we will discuss further surgical   treatment.        ______________________________  MD DENYS Singletary/MARQUISE    DD:  09/12/2024 12:45  DT:  09/12/2024 13:04    Job#:  [id number]

## 2024-09-13 ENCOUNTER — PHARMACY VISIT (OUTPATIENT)
Dept: PHARMACY | Facility: MEDICAL CENTER | Age: 54
End: 2024-09-13
Payer: COMMERCIAL

## 2024-09-13 VITALS
BODY MASS INDEX: 28.79 KG/M2 | HEIGHT: 68 IN | WEIGHT: 190 LBS | RESPIRATION RATE: 15 BRPM | DIASTOLIC BLOOD PRESSURE: 63 MMHG | HEART RATE: 60 BPM | OXYGEN SATURATION: 96 % | TEMPERATURE: 97.3 F | SYSTOLIC BLOOD PRESSURE: 100 MMHG

## 2024-09-13 LAB
ANION GAP SERPL CALC-SCNC: 9 MMOL/L (ref 7–16)
BUN SERPL-MCNC: 21 MG/DL (ref 8–22)
CALCIUM SERPL-MCNC: 8.2 MG/DL (ref 8.5–10.5)
CHLORIDE SERPL-SCNC: 104 MMOL/L (ref 96–112)
CO2 SERPL-SCNC: 25 MMOL/L (ref 20–33)
CREAT SERPL-MCNC: 0.61 MG/DL (ref 0.5–1.4)
ERYTHROCYTE [DISTWIDTH] IN BLOOD BY AUTOMATED COUNT: 40.3 FL (ref 35.9–50)
GFR SERPLBLD CREATININE-BSD FMLA CKD-EPI: 115 ML/MIN/1.73 M 2
GLUCOSE BLD STRIP.AUTO-MCNC: 190 MG/DL (ref 65–99)
GLUCOSE BLD STRIP.AUTO-MCNC: 195 MG/DL (ref 65–99)
GLUCOSE SERPL-MCNC: 174 MG/DL (ref 65–99)
HCT VFR BLD AUTO: 30.3 % (ref 42–52)
HGB BLD-MCNC: 10.2 G/DL (ref 14–18)
MAGNESIUM SERPL-MCNC: 2 MG/DL (ref 1.5–2.5)
MCH RBC QN AUTO: 30.5 PG (ref 27–33)
MCHC RBC AUTO-ENTMCNC: 33.7 G/DL (ref 32.3–36.5)
MCV RBC AUTO: 90.7 FL (ref 81.4–97.8)
PHOSPHATE SERPL-MCNC: 2.4 MG/DL (ref 2.5–4.5)
PLATELET # BLD AUTO: 329 K/UL (ref 164–446)
PMV BLD AUTO: 9.4 FL (ref 9–12.9)
POTASSIUM SERPL-SCNC: 4 MMOL/L (ref 3.6–5.5)
RBC # BLD AUTO: 3.34 M/UL (ref 4.7–6.1)
SODIUM SERPL-SCNC: 138 MMOL/L (ref 135–145)
WBC # BLD AUTO: 13.5 K/UL (ref 4.8–10.8)

## 2024-09-13 PROCEDURE — RXMED WILLOW AMBULATORY MEDICATION CHARGE: Performed by: INTERNAL MEDICINE

## 2024-09-13 PROCEDURE — 99239 HOSP IP/OBS DSCHRG MGMT >30: CPT | Performed by: INTERNAL MEDICINE

## 2024-09-13 PROCEDURE — 84100 ASSAY OF PHOSPHORUS: CPT

## 2024-09-13 PROCEDURE — 700111 HCHG RX REV CODE 636 W/ 250 OVERRIDE (IP): Mod: JZ | Performed by: INTERNAL MEDICINE

## 2024-09-13 PROCEDURE — A9270 NON-COVERED ITEM OR SERVICE: HCPCS | Performed by: INTERNAL MEDICINE

## 2024-09-13 PROCEDURE — 80048 BASIC METABOLIC PNL TOTAL CA: CPT

## 2024-09-13 PROCEDURE — 82962 GLUCOSE BLOOD TEST: CPT

## 2024-09-13 PROCEDURE — 700102 HCHG RX REV CODE 250 W/ 637 OVERRIDE(OP): Performed by: INTERNAL MEDICINE

## 2024-09-13 PROCEDURE — 700105 HCHG RX REV CODE 258: Performed by: INTERNAL MEDICINE

## 2024-09-13 PROCEDURE — 83735 ASSAY OF MAGNESIUM: CPT

## 2024-09-13 PROCEDURE — 85027 COMPLETE CBC AUTOMATED: CPT

## 2024-09-13 RX ORDER — OXYCODONE HYDROCHLORIDE 5 MG/1
5 TABLET ORAL EVERY 6 HOURS PRN
Qty: 15 TABLET | Refills: 0 | Status: SHIPPED | OUTPATIENT
Start: 2024-09-13 | End: 2024-09-18

## 2024-09-13 RX ORDER — OXYCODONE HYDROCHLORIDE 10 MG/1
10 TABLET ORAL
Status: DISCONTINUED | OUTPATIENT
Start: 2024-09-13 | End: 2024-09-13 | Stop reason: HOSPADM

## 2024-09-13 RX ORDER — MORPHINE SULFATE 4 MG/ML
2 INJECTION INTRAVENOUS
Status: DISCONTINUED | OUTPATIENT
Start: 2024-09-13 | End: 2024-09-13 | Stop reason: HOSPADM

## 2024-09-13 RX ORDER — OXYCODONE HYDROCHLORIDE 5 MG/1
5 TABLET ORAL
Status: DISCONTINUED | OUTPATIENT
Start: 2024-09-13 | End: 2024-09-13 | Stop reason: HOSPADM

## 2024-09-13 RX ORDER — ALBUTEROL SULFATE 90 UG/1
2 INHALANT RESPIRATORY (INHALATION) EVERY 6 HOURS PRN
Qty: 8.5 G | Refills: 0 | Status: SHIPPED | OUTPATIENT
Start: 2024-09-13

## 2024-09-13 RX ADMIN — ALBUTEROL SULFATE 2 PUFF: 90 INHALANT RESPIRATORY (INHALATION) at 02:40

## 2024-09-13 RX ADMIN — DIBASIC SODIUM PHOSPHATE, MONOBASIC POTASSIUM PHOSPHATE AND MONOBASIC SODIUM PHOSPHATE 500 MG: 852; 155; 130 TABLET ORAL at 09:59

## 2024-09-13 RX ADMIN — AMPICILLIN AND SULBACTAM 3 G: 1; 2 INJECTION, POWDER, FOR SOLUTION INTRAMUSCULAR; INTRAVENOUS at 05:49

## 2024-09-13 RX ADMIN — SODIUM CHLORIDE, POTASSIUM CHLORIDE, SODIUM LACTATE AND CALCIUM CHLORIDE: 600; 310; 30; 20 INJECTION, SOLUTION INTRAVENOUS at 09:08

## 2024-09-13 RX ADMIN — FAMOTIDINE 20 MG: 20 TABLET, FILM COATED ORAL at 05:48

## 2024-09-13 RX ADMIN — LIDOCAINE HYDROCHLORIDE 5 ML: 20 SOLUTION ORAL; TOPICAL at 05:49

## 2024-09-13 RX ADMIN — INSULIN HUMAN 1 UNITS: 100 INJECTION, SOLUTION PARENTERAL at 08:53

## 2024-09-13 RX ADMIN — DEXAMETHASONE SODIUM PHOSPHATE 4 MG: 4 INJECTION INTRA-ARTICULAR; INTRALESIONAL; INTRAMUSCULAR; INTRAVENOUS; SOFT TISSUE at 05:48

## 2024-09-13 RX ADMIN — OXYCODONE HYDROCHLORIDE 5 MG: 5 TABLET ORAL at 02:40

## 2024-09-13 SDOH — ECONOMIC STABILITY: TRANSPORTATION INSECURITY
IN THE PAST 12 MONTHS, HAS THE LACK OF TRANSPORTATION KEPT YOU FROM MEDICAL APPOINTMENTS OR FROM GETTING MEDICATIONS?: NO

## 2024-09-13 SDOH — ECONOMIC STABILITY: TRANSPORTATION INSECURITY
IN THE PAST 12 MONTHS, HAS LACK OF RELIABLE TRANSPORTATION KEPT YOU FROM MEDICAL APPOINTMENTS, MEETINGS, WORK OR FROM GETTING THINGS NEEDED FOR DAILY LIVING?: NO

## 2024-09-13 ASSESSMENT — PATIENT HEALTH QUESTIONNAIRE - PHQ9
1. LITTLE INTEREST OR PLEASURE IN DOING THINGS: NOT AT ALL
SUM OF ALL RESPONSES TO PHQ9 QUESTIONS 1 AND 2: 0
2. FEELING DOWN, DEPRESSED, IRRITABLE, OR HOPELESS: NOT AT ALL

## 2024-09-13 ASSESSMENT — SOCIAL DETERMINANTS OF HEALTH (SDOH)
WITHIN THE PAST 12 MONTHS, THE FOOD YOU BOUGHT JUST DIDN'T LAST AND YOU DIDN'T HAVE MONEY TO GET MORE: NEVER TRUE
IN THE PAST 12 MONTHS, HAS THE ELECTRIC, GAS, OIL, OR WATER COMPANY THREATENED TO SHUT OFF SERVICE IN YOUR HOME?: NO
WITHIN THE PAST 12 MONTHS, YOU WORRIED THAT YOUR FOOD WOULD RUN OUT BEFORE YOU GOT THE MONEY TO BUY MORE: NEVER TRUE

## 2024-09-13 ASSESSMENT — PAIN DESCRIPTION - PAIN TYPE
TYPE: ACUTE PAIN

## 2024-09-13 NOTE — CARE PLAN
The patient is Stable - Low risk of patient condition declining or worsening    Shift Goals  Clinical Goals: pain control to comfort level, IV abx administration  Patient Goals: shower, rest  Family Goals: MACIEJ    Progress made toward(s) clinical / shift goals:        Problem: Knowledge Deficit - Standard  Goal: Patient and family/care givers will demonstrate understanding of plan of care, disease process/condition, diagnostic tests and medications  Outcome: Progressing     Problem: Pain - Standard  Goal: Alleviation of pain or a reduction in pain to the patient’s comfort goal  Outcome: Progressing       Patient is not progressing towards the following goals:

## 2024-09-13 NOTE — PROGRESS NOTES
Patient down to discharge lounge at this time. IV removed and all personal belongings with patient. Patient waiting for ride and meds to beds

## 2024-09-13 NOTE — PROGRESS NOTES
Assumed pt care with RN. Pt is A&OX4 and states he is in 5/10 pain but declines interventions at this time. Plan of care discussed, no further questions. Personal belongings and call light within reach.

## 2024-09-13 NOTE — DISCHARGE PLANNING
Case Management Discharge Planning    Admission Date: 9/11/2024  GMLOS: 2.1  ALOS: 2    6-Clicks ADL Score: 24  6-Clicks Mobility Score: 24    Anticipated Discharge Dispo: Discharge Disposition: Discharged to home/self care (01)    DME Needed: No    Action(s) Taken:   Per MD, pt is medically cleared. No needs.     CM RN met with the pt at the bedside to discuss discharge plan. Pt stated he plans to return home to [place], [place] and has transportation upon discharge.     Escalations Completed: None    Medically Clear: Yes    Next Steps:  CM RN to follow up with medical team to discuss discharge barriers or needs.     Barriers to Discharge: None

## 2024-09-13 NOTE — CARE PLAN
The patient is Stable - Low risk of patient condition declining or worsening    Shift Goals  Clinical Goals: pain control  Patient Goals: pain control, discharge plan  Family Goals: MACIEJ    Progress made toward(s) clinical / shift goals:        Problem: Knowledge Deficit - Standard  Goal: Patient and family/care givers will demonstrate understanding of plan of care, disease process/condition, diagnostic tests and medications  Outcome: Progressing     Problem: Pain - Standard  Goal: Alleviation of pain or a reduction in pain to the patient’s comfort goal  Outcome: Progressing       Patient is not progressing towards the following goals:

## 2024-09-13 NOTE — DISCHARGE SUMMARY
Discharge Summary    CHIEF COMPLAINT ON ADMISSION  Chief Complaint   Patient presents with    Facial Swelling     Pt was at [place] for swollen throat and lower right sided facial swelling X 4 weeks. + fever/chills. CT showed infected submandibular salivary gland.  Pt protecting airway.        Reason for Admission  Swollen throat     Admission Date  9/11/2024    CODE STATUS  Full Code    HPI & HOSPITAL COURSE    Shelton Milton is a 54 y.o. male with history of asthma, GERD, obstructive sleep apnea, dyslipidemia who presented 9/11/2024 with complaints of right-sided facial swelling and pain for 4 weeks.  He states that he had similar episode 4 years ago.  At that time he took a course of antibiotics and symptoms subsided.  He was seen at [facility] ER earlier today and was evaluated with CT scanning of the neck soft tissue without contrast.  Impression:  large soft tissue mass in the right submandibular region which arises from right submandibular gland and contains a large central calculus, impinging upon the oropharyngeal and hypopharyngeal airways.  Mildly enlarged cervical lymph nodes.  Patient was given Unasyn and IV Decadron 8 mg and was transferred here for ENT evaluation.  Dr. Zeng/ERP consulted with Dr. Terrell/ENT who recommended IV antibiotics and steroids.  No plan for interventions for now.  Tachycardia 101 and WBC elevation 15k noted, but lactic acid is not elevated.  Of note, he was started on clindamycin p.o. on 9/9.    Pain and swelling significant improved with IV dexamethasone and Unasyn.  PT recommended 24 hours of IV steroids and antibiotics.  Symptomology significantly improved.  Patient tolerated soft diet with improved chewing and swallowing.  Facial pain and swelling were much improved.  Patient will be discharged on 2 weeks of Augmentin.  Plan for resection of salivary gland as outpatient    Medically stable to discharge home.  Follow-up with primary care and ENT as  outpatient.        Therefore, he is discharged in fair and stable condition to home with close outpatient follow-up.    The patient met 2-midnight criteria for an inpatient stay at the time of discharge.    Discharge Date  9/13/2024      FOLLOW UP ITEMS POST DISCHARGE  None    DISCHARGE DIAGNOSES  Principal Problem:    Sialoadenitis (POA: Yes)  Active Problems:    Gastroesophageal reflux disease without esophagitis (POA: Yes)    Asthma (POA: Yes)    Hypokalemia (POA: Yes)    Hyperglycemia (POA: Yes)  Resolved Problems:    * No resolved hospital problems. *      FOLLOW UP  Keiry Terrell M.D.  [address] [place] [id number]  [phone]    Follow up in 1 week(s)        MEDICATIONS ON DISCHARGE     Medication List        START taking these medications        Instructions   amoxicillin-clavulanate 875-125 MG Tabs  Commonly known as: Augmentin   Take 1 Tablet by mouth 2 times a day for 14 days.  Dose: 1 Tablet     oxyCODONE immediate-release 5 MG Tabs  Commonly known as: Roxicodone   Take 1 Tablet by mouth every 6 hours as needed for Severe Pain for up to 5 days.  Dose: 5 mg            CONTINUE taking these medications        Instructions   albuterol 108 (90 Base) MCG/ACT Aers inhalation aerosol   Inhale 2 Puffs by mouth every 6 hours as needed for Shortness of Breath.  Dose: 2 Puff     ibuprofen 200 MG Tabs  Commonly known as: Motrin   Take 600 mg by mouth every 3 hours as needed for Mild Pain. 3 tablets= 600mg  Dose: 600 mg            STOP taking these medications      clindamycin 300 MG Caps  Commonly known as: Cleocin              Allergies  Allergies   Allergen Reactions    Codeine Nausea       DIET  Orders Placed This Encounter   Procedures    Diet Order Diet: Level 7 - Easy to Chew; Liquid level: Level 0 - Thin     Standing Status:   Standing     Number of Occurrences:   1     Order Specific Question:   Diet:     Answer:   Level 7 - Easy to Chew [22]     Order Specific Question:   Liquid level     Answer:    Level 0 - Thin       ACTIVITY  As tolerated.  Weight bearing as tolerated    CONSULTATIONS  ENT    PROCEDURES  None     LABORATORY  Lab Results   Component Value Date    SODIUM 138 09/13/2024    POTASSIUM 4.0 09/13/2024    CHLORIDE 104 09/13/2024    CO2 25 09/13/2024    GLUCOSE 174 (H) 09/13/2024    BUN 21 09/13/2024    CREATININE 0.61 09/13/2024        Lab Results   Component Value Date    WBC 13.5 (H) 09/13/2024    HEMOGLOBIN 10.2 (L) 09/13/2024    HEMATOCRIT 30.3 (L) 09/13/2024    PLATELETCT 329 09/13/2024        I discussed medications and side effects with the patient.  I discussed prognosis and importance of medical compliance with the patient.  I counseled the patient about diet, exercise, weight loss, smoking cessation, and life style modifications.  All questions and concerns have been addressed.  Total time of the discharge process was 38 minutes.

## 2024-09-13 NOTE — DISCHARGE INSTRUCTIONS
Discharge Instructions per Dr. Yusuf Avilez D.O.    DIET: Supplements  Diet Order Diet: Level 7 - Easy to Chew; Liquid level: Level 0 - Thin    ACTIVITY: As tolerated    A proper diet that is low in grease, fat, and salt, along with 30 minutes of exercise per day will lead to weight loss, and better controlled blood sugar and blood pressure.    DIAGNOSIS: Sialoadenitis    Follow up with your Primary Care Provider Mita Noriega D.O. (Inactive) as scheduled or sooner if your symptoms persist or worsen.  Return to Emergency Room for sever chest pain, shortness of breath, signs of a stroke, or any other emergencies.

## 2024-09-13 NOTE — PROGRESS NOTES
Assumed care of pt at 0700. Report received and bedside rounding completed with night RN. Pt is sleeping, calm, no SOB, no acute distress noted.    POC discussed with pt, questions answered. White board updated.Call light and pt belongings within reach - hourly rounding in place. See flowsheets for assessment.

## 2025-04-05 ENCOUNTER — HOSPITAL ENCOUNTER (OUTPATIENT)
Dept: RADIOLOGY | Facility: MEDICAL CENTER | Age: 55
End: 2025-04-05
Payer: COMMERCIAL

## 2025-05-20 ENCOUNTER — OFFICE VISIT (OUTPATIENT)
Dept: MEDICAL GROUP | Facility: CLINIC | Age: 55
End: 2025-05-20
Payer: COMMERCIAL

## 2025-05-20 VITALS
HEART RATE: 92 BPM | HEIGHT: 68 IN | TEMPERATURE: 97.8 F | WEIGHT: 196.7 LBS | OXYGEN SATURATION: 93 % | BODY MASS INDEX: 29.81 KG/M2 | SYSTOLIC BLOOD PRESSURE: 114 MMHG | DIASTOLIC BLOOD PRESSURE: 77 MMHG

## 2025-05-20 DIAGNOSIS — Z12.11 SCREENING FOR COLORECTAL CANCER: ICD-10-CM

## 2025-05-20 DIAGNOSIS — K11.20 SIALOADENITIS: Primary | ICD-10-CM

## 2025-05-20 DIAGNOSIS — D64.9 ANEMIA, UNSPECIFIED TYPE: ICD-10-CM

## 2025-05-20 DIAGNOSIS — I82.401 ACUTE DEEP VEIN THROMBOSIS (DVT) OF RIGHT LOWER EXTREMITY, UNSPECIFIED VEIN (HCC): ICD-10-CM

## 2025-05-20 DIAGNOSIS — Z12.12 SCREENING FOR COLORECTAL CANCER: ICD-10-CM

## 2025-05-20 ASSESSMENT — PATIENT HEALTH QUESTIONNAIRE - PHQ9
CLINICAL INTERPRETATION OF PHQ2 SCORE: 2
SUM OF ALL RESPONSES TO PHQ QUESTIONS 1-9: 8
5. POOR APPETITE OR OVEREATING: 1 - SEVERAL DAYS

## 2025-05-20 ASSESSMENT — FIBROSIS 4 INDEX: FIB4 SCORE: 0.63

## 2025-05-20 NOTE — PROGRESS NOTES
"[facility] MEDICINE     PATIENT ID:  NAME:  Shelton Milton  MRN:               [id number]  YOB: 1970    Date: 3:19 PM      Fellow: Mark Brown M.D.    CC:  Multiple concerns      HPI: Shelton Milton is a 55 y.o. male who presented with multiple concerns.  The patient states he was recently seen in hospital at [facility] and admitted secondary to a right lower extremity DVT.  Patient was started on anticoagulation and advised to get a primary care provider for continued management.  Patient states that he was running low on his medications before he was able to follow-up with our clinic and was \"rationing them\" over the last few days and took his last dose yesterday.  He denies any new or worsening swelling of his lower extremities, lower extremity pain, shortness of breath, fever, or pleuritic chest pain.  Otherwise the patient states he has a longstanding history of sialoadenitis and was seen by ENT in the past and advised that he has a salivary stone that may need to be extracted surgically.  He does note some slightly increased swelling to his right submandibular salivary gland.  He denies any recent infectious symptoms such as fever, nausea, vomiting, lightheadedness, dizziness or chills.  Otherwise patient states he has been at his baseline state of health and has no other concerns.    No problems updated.    REVIEW OF SYSTEMS:   Ten systems reviewed and were negative except as noted in the HPI.                PROBLEM LIST  Problem List[1]     PAST SURGICAL HISTORY:  Past Surgical History[2]    FAMILY HISTORY:  Family History   Problem Relation Age of Onset    Diabetes Mother     Diabetes Father     Hypertension Father     Other Father         gout    Stroke Sister     Diabetes Paternal Uncle     Cancer Paternal Uncle         bladder       SOCIAL HISTORY:   Social History     Tobacco Use    Smoking status: Former     Types: Cigarettes    Smokeless tobacco: Never   Substance Use Topics    " "Alcohol use: Yes     Comment: lightly       ALLERGIES:  Allergies[3]    OUTPATIENT MEDICATIONS:  Medications - Current, Listed Continuously[4]    PHYSICAL EXAM:  Vitals:    05/20/25 1450   BP: 114/77   BP Location: Left arm   Patient Position: Sitting   BP Cuff Size: Adult   Pulse: 92   Temp: 36.6 °C (97.8 °F)   TempSrc: Temporal   SpO2: 93%   Weight: 89.2 kg (196 lb 11.2 oz)   Height: 1.727 m (5' 8\")       General: Pt resting in NAD, pleasant and cooperative   Skin:  Pink, warm and dry.  HEENT: NC/AT. EOMI. there is enlargement of the right submandibular salivary gland with no tenderness to palpation, no overlying skin changes  Lungs:  Symmetrical.  CTAB, good air movement, no adventitious sounds  Cardiovascular:  S1/S2 RRR, no murmurs rubs or gallops   Abdomen:  Abdomen is soft, nontender, no distention, no peritoneal signs  Extremities:  Full range of motion.  CNS:  Muscle tone is normal. No gross focal neurologic deficits      ASSESSMENT/PLAN:   55 y.o. male who presents to clinic with multiple concerns.    1. Sialoadenitis (Primary)  Patient reports longstanding history of sialoadenitis.  Patient states that he has been seen by ENT in the past for this and was advised that he does have a salivary stone and may require surgical excision of this in the future.  He notes that over the past few months he has noticed some swelling to his right submandibular salivary gland and he says over the past several years he has been able to express a small amount of pus from this in his mouth but denies any other associated infectious symptoms.  He denies any recent fever, chills, nausea, vomiting, headache, lightheadedness, dizziness or pain to the salivary glands.  Counseled the patient on sialoadenitis.  On exam today patient does have swelling of the right submandibular salivary gland.  Will plan to evaluate further with ultrasound of soft tissues of head and neck and will provide a referral to ENT.  Counseled patient if " he develops any infectious symptoms to return to clinic or be seen in the emergency department as antibiotic treatment may be necessary.  - Referral to ENT  - US-SOFT TISSUES OF HEAD - NECK; Future    2. Acute deep vein thrombosis (DVT) of right lower extremity, unspecified vein (HCC)  Patient recently seen in hospital 1 month ago for right lower extremity swelling and pain.  He states that at that time ultrasound was performed and he was diagnosed with a DVT.  On further discussion patient does not have any known provoking cause for this so I am concerned this may represent an unprovoked DVT.  He does note a history of DVT in his father.  Do not currently have any records from [facility] at this time, will perform record request for further evaluation of his inpatient stay.  On examination today patient is not tachycardic, no fever and otherwise appears grossly normal as above.  Patient states he did run out of his apixaban yesterday and needs a refill.  Counseled patient on importance of staying on this medication consistently.  Will provide refill at this time as well as a referral to vascular medicine for further evaluation of potential unprovoked clot and need for prolonged anticoagulation.  Will order CBC, PT/PTT, APTT, AT 3, factor V Leiden, factor II, protein C and protein S at this time for further evaluation of possible coagulation disorder.  Counseled patient on red flag symptoms and when to be seen in the emergency department given his inconsistent use of DOAC.  Will plan to follow-up based on laboratory studies and if any new concerns.  - apixaban (ELIQUIS) 5mg Tab; Take 1 Tablet by mouth 2 times a day.  Dispense: 60 Tablet; Refill: 1  - Referral to Vascular Medicine  - IRON/TOTAL IRON BIND; Future  - CBC WITH DIFFERENTIAL; Future  - PT AND PTT  - APTT; Future  - AT-III FUNCTIONAL; Future  - FACTOR V LEIDEN MUTATION; Future  - FACTOR II DNA ANALYSIS; Future  - PROTEIN C FUNCTIONAL; Future  -  PROTEIN S FUNCTIONAL; Future    3. Anemia, unspecified type  Patient states he was found to be anemic while in hospital.  He states that they did give him 2 units of packed red blood cells.  He reports no known bleeding or cause for blood loss.  Will request records at this time for further evaluation.  On exam today patient does not appear pale and vitals are stable.  Will reevaluate after hospital records received.  Will also order CBC at this time for further evaluation.    4. Screening for colorectal cancer  Patient is due for colorectal cancer screening at this time, referral for colonoscopy placed today.  - Referral to GI for Colonoscopy    Differential diagnosis, natural history, supportive care, and indications for immediate follow-up discussed.    Advised the patient to follow-up in clinic for recheck, reevaluation, and consideration of further management. If acute concerns or red flag symptoms go to the ED for acute evaluation.       Problem List Items Addressed This Visit       Sialoadenitis - Primary    Relevant Orders    Referral to ENT    US-SOFT TISSUES OF HEAD - NECK     Other Visit Diagnoses         Acute deep vein thrombosis (DVT) of right lower extremity, unspecified vein (HCC)        Relevant Medications    apixaban (ELIQUIS) 5mg Tab    Other Relevant Orders    Referral to Vascular Medicine    IRON/TOTAL IRON BIND    PT AND PTT    APTT    AT-III FUNCTIONAL    FACTOR V LEIDEN MUTATION    FACTOR II DNA ANALYSIS    PROTEIN C FUNCTIONAL    PROTEIN S FUNCTIONAL      Anemia, unspecified type        Relevant Orders    CBC WITH DIFFERENTIAL      Screening for colorectal cancer        Relevant Orders    Referral to GI for Colonoscopy            Mark Brown M.D.  PGY-4, [employer] Fellow  [facility]          [1]   Patient Active Problem List  Diagnosis    JULIAN (obstructive sleep apnea)    Chronic pain of both knees    Other male erectile dysfunction    Right elbow pain    Gastroesophageal  reflux disease without esophagitis    Dyslipidemia    Low vitamin D level    Sialoadenitis    Asthma    Hypokalemia    Hyperglycemia   [2]   Past Surgical History:  Procedure Laterality Date    HIATAL HERNIA REPAIR      2 x (repair)    OTHER ABDOMINAL SURGERY     [3]   Allergies  Allergen Reactions    Codeine Nausea   [4]   Current Outpatient Medications:     apixaban (ELIQUIS) 5mg Tab, Take 1 Tablet by mouth 2 times a day., Disp: 60 Tablet, Rfl: 1    albuterol 108 (90 Base) MCG/ACT Aero Soln inhalation aerosol, Inhale 2 Puffs every 6 hours as needed for Shortness of Breath., Disp: 8.5 g, Rfl: 0    ibuprofen (MOTRIN) 200 MG Tab, Take 600 mg by mouth every 3 hours as needed for Mild Pain. 3 tablets= 600mg (Patient not taking: Reported on 5/20/2025), Disp: , Rfl:

## 2025-05-20 NOTE — LETTER
[facility]  Mita Noriega D.O. (Inactive)  [address]  [place] [address]  Fax: [phone]   Authorization for Release/Disclosure of   Protected Health Information   Name: SHELTON MILTON : 1970 SSN: [id number]   Address: [address] [place] [address] Phone:    [phone] (home)    I authorize the entity listed below to release/disclose the PHI below to:   [facility]/Mita Noriega D.O. (Inactive) and Mark Brown M.D.   Provider or Entity Name:     Address   City, State, Zip   Phone:      Fax:     Reason for request: continuity of care   Information to be released:    [  ] LAST COLONOSCOPY,  including any PATH REPORT and follow-up  [  ] LAST FIT/COLOGUARD RESULT [  ] LAST DEXA  [  ] LAST MAMMOGRAM  [  ] LAST PAP  [  ] LAST LABS [  ] RETINA EXAM REPORT  [  ] IMMUNIZATION RECORDS  [  ] Release all info      [  ] Check here and initial the line next to each item to release ALL health information INCLUDING  _____ Care and treatment for drug and / or alcohol abuse  _____ HIV testing, infection status, or AIDS  _____ Genetic Testing    DATES OF SERVICE OR TIME PERIOD TO BE DISCLOSED: _____________  I understand and acknowledge that:  * This Authorization may be revoked at any time by you in writing, except if your health information has already been used or disclosed.  * Your health information that will be used or disclosed as a result of you signing this authorization could be re-disclosed by the recipient. If this occurs, your re-disclosed health information may no longer be protected by State or Federal laws.  * You may refuse to sign this Authorization. Your refusal will not affect your ability to obtain treatment.  * This Authorization becomes effective upon signing and will  on (date) __________.      If no date is indicated, this Authorization will  one (1) year from the signature date.    Name: Shelton Milton  Signature: Date:   2025     PLEASE FAX REQUESTED RECORDS  BACK TO: [phone]

## 2025-05-21 ENCOUNTER — HOSPITAL ENCOUNTER (OUTPATIENT)
Facility: MEDICAL CENTER | Age: 55
End: 2025-05-21
Payer: COMMERCIAL

## 2025-05-21 DIAGNOSIS — I82.401 ACUTE DEEP VEIN THROMBOSIS (DVT) OF RIGHT LOWER EXTREMITY, UNSPECIFIED VEIN (HCC): ICD-10-CM

## 2025-05-21 DIAGNOSIS — D64.9 ANEMIA, UNSPECIFIED TYPE: ICD-10-CM

## 2025-05-21 LAB
ACANTHOCYTES BLD QL SMEAR: NORMAL
ANISOCYTOSIS BLD QL SMEAR: ABNORMAL
APTT PPP: 33.2 SEC (ref 24.7–36)
BASOPHILS # BLD AUTO: 0.6 % (ref 0–1.8)
BASOPHILS # BLD: 0.03 K/UL (ref 0–0.12)
COMMENT 1642: NORMAL
DACRYOCYTES BLD QL SMEAR: NORMAL
EOSINOPHIL # BLD AUTO: 0.15 K/UL (ref 0–0.51)
EOSINOPHIL NFR BLD: 2.8 % (ref 0–6.9)
ERYTHROCYTE [DISTWIDTH] IN BLOOD BY AUTOMATED COUNT: 58.6 FL (ref 35.9–50)
HCT VFR BLD AUTO: 33.1 % (ref 42–52)
HGB BLD-MCNC: 8.9 G/DL (ref 14–18)
HYPOCHROMIA BLD QL SMEAR: ABNORMAL
IMM GRANULOCYTES # BLD AUTO: 0.02 K/UL (ref 0–0.11)
IMM GRANULOCYTES NFR BLD AUTO: 0.4 % (ref 0–0.9)
INR PPP: 1.27 (ref 0.87–1.13)
LYMPHOCYTES # BLD AUTO: 1.61 K/UL (ref 1–4.8)
LYMPHOCYTES NFR BLD: 29.9 % (ref 22–41)
MCH RBC QN AUTO: 19.1 PG (ref 27–33)
MCHC RBC AUTO-ENTMCNC: 26.9 G/DL (ref 32.3–36.5)
MCV RBC AUTO: 71 FL (ref 81.4–97.8)
MICROCYTES BLD QL SMEAR: ABNORMAL
MONOCYTES # BLD AUTO: 0.53 K/UL (ref 0–0.85)
MONOCYTES NFR BLD AUTO: 9.9 % (ref 0–13.4)
MORPHOLOGY BLD-IMP: NORMAL
NEUTROPHILS # BLD AUTO: 3.04 K/UL (ref 1.82–7.42)
NEUTROPHILS NFR BLD: 56.4 % (ref 44–72)
NRBC # BLD AUTO: 0 K/UL
NRBC BLD-RTO: 0 /100 WBC (ref 0–0.2)
OVALOCYTES BLD QL SMEAR: NORMAL
PLATELET # BLD AUTO: 388 K/UL (ref 164–446)
PMV BLD AUTO: 9.2 FL (ref 9–12.9)
POIKILOCYTOSIS BLD QL SMEAR: NORMAL
PROTHROMBIN TIME: 15.9 SEC (ref 12–14.6)
RBC # BLD AUTO: 4.66 M/UL (ref 4.7–6.1)
RBC BLD AUTO: PRESENT
SCHISTOCYTES BLD QL SMEAR: NORMAL
WBC # BLD AUTO: 5.4 K/UL (ref 4.8–10.8)

## 2025-05-21 PROCEDURE — 85303 CLOT INHIBIT PROT C ACTIVITY: CPT

## 2025-05-21 PROCEDURE — 85730 THROMBOPLASTIN TIME PARTIAL: CPT

## 2025-05-21 PROCEDURE — 85610 PROTHROMBIN TIME: CPT

## 2025-05-21 PROCEDURE — 81241 F5 GENE: CPT

## 2025-05-21 PROCEDURE — 83550 IRON BINDING TEST: CPT

## 2025-05-21 PROCEDURE — 85025 COMPLETE CBC W/AUTO DIFF WBC: CPT

## 2025-05-21 PROCEDURE — 85306 CLOT INHIBIT PROT S FREE: CPT

## 2025-05-21 PROCEDURE — 36415 COLL VENOUS BLD VENIPUNCTURE: CPT

## 2025-05-21 PROCEDURE — 83540 ASSAY OF IRON: CPT

## 2025-05-21 PROCEDURE — 81240 F2 GENE: CPT

## 2025-05-21 PROCEDURE — 85300 ANTITHROMBIN III ACTIVITY: CPT

## 2025-05-22 LAB
IRON SATN MFR SERPL: 4 % (ref 15–55)
IRON SERPL-MCNC: 18 UG/DL (ref 50–180)
TIBC SERPL-MCNC: 444 UG/DL (ref 250–450)
UIBC SERPL-MCNC: 426 UG/DL (ref 110–370)

## 2025-05-24 LAB
AT III ACT/NOR PPP CHRO: 116 % (ref 76–128)
PROT C ACT/NOR PPP: 94 % (ref 83–168)
PROT S ACT/NOR PPP: 92 % (ref 66–143)

## 2025-05-27 LAB — F5 P.R506Q BLD/T QL: NEGATIVE

## 2025-05-28 ENCOUNTER — TELEPHONE (OUTPATIENT)
Dept: HEALTH INFORMATION MANAGEMENT | Facility: OTHER | Age: 55
End: 2025-05-28
Payer: COMMERCIAL

## 2025-05-28 LAB — F2 C.20210G>A GENO BLD/T: NEGATIVE

## 2025-05-28 NOTE — Clinical Note
REFERRAL APPROVAL NOTICE         Sent on May 28, 2025                   Shelton Milton  [address]  [place] [address]                   Dear Mr. Milton,    After a careful review of the medical information and benefit coverage, [facility] has processed your referral. See below for additional details.    If applicable, you must be actively enrolled with your insurance for coverage of the authorized service. If you have any questions regarding your coverage, please contact your insurance directly.    REFERRAL INFORMATION   Referral #:  [id number]  Referred-To Provider    Referred-By Provider:  Otolaryngology    WIL Osman DANIEL H      [address] [facility] [place] [address]  [phone] [address] [facility]  [place] [address]  [phone]    Referral Start Date:  05/20/2025  Referral End Date:   05/20/2026             SCHEDULING  If you do not already have an appointment, please call [phone] to make an appointment.     MORE INFORMATION  If you do not already have a [employer] account, sign up at: [email or web address]  You can access your medical information, make appointments, see lab results, billing information, and more.  If you have questions regarding this referral, please contact  the [facility] Referrals department at:             [phone]. Monday - Friday 8:00AM - 5:00PM.     Sincerely,    [facility]

## 2025-05-28 NOTE — Clinical Note
REFERRAL APPROVAL NOTICE         Sent on May 28, 2025                   Shelton Milton  [address]  [facility] [address]                   Dear Mr. Milton,    After a careful review of the medical information and benefit coverage, [facility] has processed your referral. See below for additional details.    If applicable, you must be actively enrolled with your insurance for coverage of the authorized service. If you have any questions regarding your coverage, please contact your insurance directly.    REFERRAL INFORMATION   Referral #:  [id number]  Referred-To Department    Referred-By Provider:  Vascular Medicine    Mark Brown M.D.   Vascular Medicine      [address] [facility] [address]  [phone] [address] [place] [address]  [phone]    Referral Start Date:  05/20/2025  Referral End Date:   05/20/2026             SCHEDULING  If you do not already have an appointment, please call [phone] to make an appointment.     MORE INFORMATION  If you do not already have a [employer] account, sign up at: [email or web address]  You can access your medical information, make appointments, see lab results, billing information, and more.  If you have questions regarding this referral, please contact  the [facility] Referrals department at:             [phone]. Monday - Friday 8:00AM - 5:00PM.     Sincerely,    [facility]

## 2025-05-28 NOTE — Clinical Note
REFERRAL APPROVAL NOTICE         Sent on May 28, 2025                   Shelton Milton  [address]  [place] [address]                   Dear Mr. Milton,    After a careful review of the medical information and benefit coverage, [facility] has processed your referral. See below for additional details.    If applicable, you must be actively enrolled with your insurance for coverage of the authorized service. If you have any questions regarding your coverage, please contact your insurance directly.    REFERRAL INFORMATION   Referral #:  [id number]  Referred-To Provider    Referred-By Provider:  Gastroenterology    Mark Brown M.D.   [facility]      [address] [facility] [address]  [phone] [address] [facility] [address]  [phone]    Referral Start Date:  05/20/2025  Referral End Date:   05/20/2026             SCHEDULING  If you do not already have an appointment, please call [phone] to make an appointment.     MORE INFORMATION  If you do not already have a [employer] account, sign up at: [email or web address]  You can access your medical information, make appointments, see lab results, billing information, and more.  If you have questions regarding this referral, please contact  the [facility] Referrals department at:             [phone]. Monday - Friday 8:00AM - 5:00PM.     Sincerely,    [facility]

## 2025-06-13 DIAGNOSIS — J45.20 MILD INTERMITTENT ASTHMA, UNSPECIFIED WHETHER COMPLICATED: ICD-10-CM

## 2025-06-13 NOTE — TELEPHONE ENCOUNTER
Received request via: Patient    Was the patient seen in the last year in this department? Yes    Does the patient have an active prescription (recently filled or refills available) for medication(s) requested? No    Pharmacy Name: [id number] - [place], [place] - [address]     Does the patient have [facility] and need 100-day supply? (This applies to ALL medications) Patient does not have SCP

## 2025-06-17 RX ORDER — ALBUTEROL SULFATE 90 UG/1
2 INHALANT RESPIRATORY (INHALATION) EVERY 6 HOURS PRN
Qty: 8.5 G | Refills: 0 | Status: SHIPPED | OUTPATIENT
Start: 2025-06-17

## 2025-06-20 ENCOUNTER — HOSPITAL ENCOUNTER (OUTPATIENT)
Facility: MEDICAL CENTER | Age: 55
End: 2025-06-20
Payer: COMMERCIAL

## 2025-06-20 ENCOUNTER — OFFICE VISIT (OUTPATIENT)
Dept: MEDICAL GROUP | Facility: CLINIC | Age: 55
End: 2025-06-20
Payer: COMMERCIAL

## 2025-06-20 VITALS
HEIGHT: 65 IN | BODY MASS INDEX: 32.99 KG/M2 | SYSTOLIC BLOOD PRESSURE: 115 MMHG | OXYGEN SATURATION: 93 % | DIASTOLIC BLOOD PRESSURE: 65 MMHG | WEIGHT: 198 LBS | HEART RATE: 81 BPM | TEMPERATURE: 97.6 F

## 2025-06-20 DIAGNOSIS — K11.5 SIALOLITH: ICD-10-CM

## 2025-06-20 DIAGNOSIS — Z11.59 ENCOUNTER FOR HEPATITIS C SCREENING TEST FOR LOW RISK PATIENT: ICD-10-CM

## 2025-06-20 DIAGNOSIS — D64.9 ANEMIA, UNSPECIFIED TYPE: ICD-10-CM

## 2025-06-20 DIAGNOSIS — Z11.4 ENCOUNTER FOR SCREENING FOR HIV: ICD-10-CM

## 2025-06-20 DIAGNOSIS — R74.8 ELEVATED ALKALINE PHOSPHATASE LEVEL: ICD-10-CM

## 2025-06-20 DIAGNOSIS — Z12.11 COLON CANCER SCREENING: ICD-10-CM

## 2025-06-20 DIAGNOSIS — Z28.21 IMMUNIZATION DECLINED: ICD-10-CM

## 2025-06-20 DIAGNOSIS — K11.20 SIALOADENITIS: Primary | ICD-10-CM

## 2025-06-20 LAB
ALBUMIN SERPL BCP-MCNC: 4.2 G/DL (ref 3.2–4.9)
ALBUMIN/GLOB SERPL: 1.4 G/DL
ALP SERPL-CCNC: 76 U/L (ref 30–99)
ALT SERPL-CCNC: 24 U/L (ref 2–50)
ANION GAP SERPL CALC-SCNC: 13 MMOL/L (ref 7–16)
ANISOCYTOSIS BLD QL SMEAR: ABNORMAL
AST SERPL-CCNC: 24 U/L (ref 12–45)
BASOPHILS # BLD AUTO: 0.9 % (ref 0–1.8)
BASOPHILS # BLD: 0.04 K/UL (ref 0–0.12)
BILIRUB SERPL-MCNC: 0.3 MG/DL (ref 0.1–1.5)
BUN SERPL-MCNC: 9 MG/DL (ref 8–22)
CALCIUM ALBUM COR SERPL-MCNC: 8.8 MG/DL (ref 8.5–10.5)
CALCIUM SERPL-MCNC: 9 MG/DL (ref 8.5–10.5)
CHLORIDE SERPL-SCNC: 104 MMOL/L (ref 96–112)
CO2 SERPL-SCNC: 22 MMOL/L (ref 20–33)
COMMENT 1642: NORMAL
CREAT SERPL-MCNC: 0.78 MG/DL (ref 0.5–1.4)
EOSINOPHIL # BLD AUTO: 0.14 K/UL (ref 0–0.51)
EOSINOPHIL NFR BLD: 3.1 % (ref 0–6.9)
ERYTHROCYTE [DISTWIDTH] IN BLOOD BY AUTOMATED COUNT: 48.4 FL (ref 35.9–50)
GFR SERPLBLD CREATININE-BSD FMLA CKD-EPI: 106 ML/MIN/1.73 M 2
GLOBULIN SER CALC-MCNC: 3.1 G/DL (ref 1.9–3.5)
GLUCOSE SERPL-MCNC: 96 MG/DL (ref 65–99)
HCT VFR BLD AUTO: 35.9 % (ref 42–52)
HCV AB SER QL: NORMAL
HGB BLD-MCNC: 9.8 G/DL (ref 14–18)
HIV 1+2 AB+HIV1 P24 AG SERPL QL IA: NORMAL
HYPOCHROMIA BLD QL SMEAR: ABNORMAL
IMM GRANULOCYTES # BLD AUTO: 0.03 K/UL (ref 0–0.11)
IMM GRANULOCYTES NFR BLD AUTO: 0.7 % (ref 0–0.9)
LYMPHOCYTES # BLD AUTO: 1.74 K/UL (ref 1–4.8)
LYMPHOCYTES NFR BLD: 38.7 % (ref 22–41)
MCH RBC QN AUTO: 18.7 PG (ref 27–33)
MCHC RBC AUTO-ENTMCNC: 27.3 G/DL (ref 32.3–36.5)
MCV RBC AUTO: 68.6 FL (ref 81.4–97.8)
MICROCYTES BLD QL SMEAR: ABNORMAL
MONOCYTES # BLD AUTO: 0.49 K/UL (ref 0–0.85)
MONOCYTES NFR BLD AUTO: 10.9 % (ref 0–13.4)
MORPHOLOGY BLD-IMP: NORMAL
NEUTROPHILS # BLD AUTO: 2.06 K/UL (ref 1.82–7.42)
NEUTROPHILS NFR BLD: 45.7 % (ref 44–72)
NRBC # BLD AUTO: 0 K/UL
NRBC BLD-RTO: 0 /100 WBC (ref 0–0.2)
OVALOCYTES BLD QL SMEAR: NORMAL
PLATELET # BLD AUTO: 357 K/UL (ref 164–446)
PLATELET BLD QL SMEAR: NORMAL
PMV BLD AUTO: 9.6 FL (ref 9–12.9)
POIKILOCYTOSIS BLD QL SMEAR: NORMAL
POTASSIUM SERPL-SCNC: 3.8 MMOL/L (ref 3.6–5.5)
PROT SERPL-MCNC: 7.3 G/DL (ref 6–8.2)
RBC # BLD AUTO: 5.23 M/UL (ref 4.7–6.1)
RBC BLD AUTO: PRESENT
SODIUM SERPL-SCNC: 139 MMOL/L (ref 135–145)
WBC # BLD AUTO: 4.5 K/UL (ref 4.8–10.8)

## 2025-06-20 PROCEDURE — 3078F DIAST BP <80 MM HG: CPT

## 2025-06-20 PROCEDURE — 3074F SYST BP LT 130 MM HG: CPT

## 2025-06-20 PROCEDURE — 99213 OFFICE O/P EST LOW 20 MIN: CPT | Mod: GE

## 2025-06-20 PROCEDURE — 87389 HIV-1 AG W/HIV-1&-2 AB AG IA: CPT

## 2025-06-20 PROCEDURE — 80053 COMPREHEN METABOLIC PANEL: CPT

## 2025-06-20 PROCEDURE — 86803 HEPATITIS C AB TEST: CPT

## 2025-06-20 PROCEDURE — 36415 COLL VENOUS BLD VENIPUNCTURE: CPT

## 2025-06-20 PROCEDURE — 85025 COMPLETE CBC W/AUTO DIFF WBC: CPT

## 2025-06-20 ASSESSMENT — FIBROSIS 4 INDEX: FIB4 SCORE: 0.53

## 2025-06-20 NOTE — ASSESSMENT & PLAN NOTE
Mild, asymptomatic, etiology unknown. History complicated by anemia of unknown etiology. Concerns for malignancy.     Plan:  -Repeat CMP  -Referral to GI  -Order PSA

## 2025-06-20 NOTE — ASSESSMENT & PLAN NOTE
Colon cancer screening due. Patient agreeable to complete.     Plan:  -Referral to GI for colonscopy

## 2025-06-20 NOTE — ASSESSMENT & PLAN NOTE
Chronic, microcytic, asymptomatic, etiology unknown, history did not reveal any evidence of upper or lower GI bleeding. I am concerned for malignancy, patient has not up to date on colonoscopy.     Plan:  -Repeat CBC  -Referral to GI for colonoscopy  -Return precautions given

## 2025-06-20 NOTE — ASSESSMENT & PLAN NOTE
Chronic, recurrently, affecting primarily the right submandibular salivary gland.     Plan:  -Referral to ENT

## 2025-06-20 NOTE — ASSESSMENT & PLAN NOTE
Chronic, recurrent, primarily affecting the right submandibular gland, complicated by sialolith. Patient previously established with ENT but was lost to follow up.     Plan:  -Referral to ENT  -No evidence of infection at this time, abx not indicated.

## 2025-06-20 NOTE — ASSESSMENT & PLAN NOTE
Patient had declined at this time the following immunizations: Shingles, pneumonia, Tdap and COVID.     Plan:  -Follow up with patient at next visit. Patient wished to due more research on these recommended immunizations.

## 2025-06-20 NOTE — PROGRESS NOTES
"   Subjective:     CC:   Chief Complaint   Patient presents with    Follow-Up     Follow up     Referral Needed       HPI:   Shelton presents today for lab follow up and to request referral to ENT.   Lab follow up: Reviewed recent labs with patient which were notable for microcytic anemia with Hgb 8.9 (5/21). Patient denies any hematemesis, melena or hematochezia. Per chart review, this appears to be a chronic problem that has been gradually worsening. Patient has not yet had a colonoscopy.   ENT Referral: Patient has a long standing history of sialodenitis and sialiths, primarily affecting the right submandibular salivary gland. He was previously established with ENT but was lost to follow up. Currently, his right submandibular glad is significantly enlarged and continues to secrete mucousy malodorous discharge. Patient denies any fevers, chills, nausea or vomiting.   Preventative care: Patient has declined all due immunizations at this time and would like to do some research on them (TdaP, pneumonia, and shingles). Patient is agreeable to get HIV, Hep C and colon cancer screenings.     Problem   Sialolith   Anemia   Colon Cancer Screening   Elevated Alkaline Phosphatase Level   Encounter for Hepatitis C Screening Test for Low Risk Patient   Encounter for Screening for HIV   Immunization Declined   Sialoadenitis       Current Medications and Prescriptions Ordered in Epic[1]    ROS:  Negative except for above in HPI    Objective:     Exam:  /65 (BP Location: Right arm, Patient Position: Sitting, BP Cuff Size: Adult)   Pulse 81   Temp 36.4 °C (97.6 °F)   Ht 1.651 m (5' 5\")   Wt 89.8 kg (198 lb)   SpO2 93%   BMI 32.95 kg/m²  Body mass index is 32.95 kg/m².    Gen: Pleasant, decreased pallor  HEENT: NCAT, MMM, No lymphadenopathy, significantly enlarged right submandibular salivary gland, hard, nontender  Lungs: Normal effort, CTA bilaterally, no wheezes, rhonchi, or rales  CV: Regular rate and rhythm. No " murmurs, rubs, or gallops. Radial pulses palpable bilat  Abd: Soft, non-distended, no guarding, no rebound, non-tender to palpation  Ext: No clubbing, cyanosis, edema.  Neuro: Non-focal      Assessment & Plan:     55 y.o. male with the following -     Problem List Items Addressed This Visit       Sialoadenitis - Primary    Chronic, recurrent, primarily affecting the right submandibular gland, complicated by sialolith. Patient previously established with ENT but was lost to follow up.     Plan:  -Referral to ENT  -No evidence of infection at this time, abx not indicated.          Relevant Orders    Referral to ENT    Sialolith    Chronic, recurrently, affecting primarily the right submandibular salivary gland.     Plan:  -Referral to ENT          Relevant Orders    Referral to ENT    Anemia    Chronic, microcytic, asymptomatic, etiology unknown, history did not reveal any evidence of upper or lower GI bleeding. I am concerned for malignancy, patient has not up to date on colonoscopy.     Plan:  -Repeat CBC  -Referral to GI for colonoscopy  -Return precautions given          Relevant Orders    CBC WITH DIFFERENTIAL    PROSTATE SPECIFIC AG SCREENING    Colon cancer screening    Colon cancer screening due. Patient agreeable to complete.     Plan:  -Referral to GI for colonscopy         Relevant Orders    Referral to GI for Colonoscopy    Elevated alkaline phosphatase level    Mild, asymptomatic, etiology unknown. History complicated by anemia of unknown etiology. Concerns for malignancy.     Plan:  -Repeat CMP  -Referral to GI  -Order PSA         Relevant Orders    Comp Metabolic Panel    PROSTATE SPECIFIC AG SCREENING    Encounter for hepatitis C screening test for low risk patient    Routine screening, low risk patient.     Plan:  -Order Hep C screening         Relevant Orders    HEP C VIRUS ANTIBODY    Encounter for screening for HIV    Routine screening, low risk patient.     Plan:  -Order HIV screening          Relevant Orders    HIV AG/AB COMBO ASSAY SCREENING    Immunization declined    Patient had declined at this time the following immunizations: Shingles, pneumonia, Tdap and COVID.     Plan:  -Follow up with patient at next visit. Patient wished to due more research on these recommended immunizations.                No follow-ups on file.    Aman Bassett M.D.             [1]   Current Outpatient Medications Ordered in Epic   Medication Sig Dispense Refill    albuterol 108 (90 Base) MCG/ACT Aero Soln inhalation aerosol Inhale 2 Puffs every 6 hours as needed for Shortness of Breath. 8.5 g 0    apixaban (ELIQUIS) 5mg Tab Take 1 Tablet by mouth 2 times a day. 60 Tablet 1    ibuprofen (MOTRIN) 200 MG Tab Take 600 mg by mouth every 3 hours as needed for Mild Pain. 3 tablets= 600mg (Patient not taking: Reported on 5/20/2025)       No current [facility]-ordered facility-administered medications on file.

## 2025-06-24 ENCOUNTER — RESULTS FOLLOW-UP (OUTPATIENT)
Dept: MEDICAL GROUP | Facility: CLINIC | Age: 55
End: 2025-06-24

## 2025-06-26 ENCOUNTER — DOCUMENTATION (OUTPATIENT)
Facility: MEDICAL CENTER | Age: 55
End: 2025-06-26
Payer: COMMERCIAL

## 2025-06-26 NOTE — Clinical Note
REFERRAL APPROVAL NOTICE         Sent on June 26, 2025                   Shelton Milton  [address]  [place] [address]                   Dear Mr. Milton,    After a careful review of the medical information and benefit coverage, [facility] has processed your referral. See below for additional details.    If applicable, you must be actively enrolled with your insurance for coverage of the authorized service. If you have any questions regarding your coverage, please contact your insurance directly.    REFERRAL INFORMATION   Referral #:  [id number]  Referred-To Provider    Referred-By Provider:  Gastroenterology    Aman Bassett M.D.   [facility]      [address] [facility] [address]  [phone] [address] [facility] [place] [address]  [phone]    Referral Start Date:  06/20/2025  Referral End Date:   06/20/2026             SCHEDULING  If you do not already have an appointment, please call [phone] to make an appointment.     MORE INFORMATION  If you do not already have a [email or web address] account, sign up at: [email or web address]  You can access your medical information, make appointments, see lab results, billing information, and more.  If you have questions regarding this referral, please contact  the [facility] Referrals department at:             [phone]. Monday - Friday 8:00AM - 5:00PM.     Sincerely,    [facility]

## 2025-06-26 NOTE — Clinical Note
REFERRAL APPROVAL NOTICE         Sent on June 26, 2025                   Shelton Milton  [address]  [place] [address]                   Dear Mr. Milton,    After a careful review of the medical information and benefit coverage, [facility] has processed your referral. See below for additional details.    If applicable, you must be actively enrolled with your insurance for coverage of the authorized service. If you have any questions regarding your coverage, please contact your insurance directly.    REFERRAL INFORMATION   Referral #:  [id number]  Referred-To Provider    Referred-By Provider:  Otolaryngology    Aman Bassett M.D.   [facility]      [address]  [facility] [address]  [phone] [address]  [facility] [address]  [phone]    Referral Start Date:  06/20/2025  Referral End Date:   06/20/2026             SCHEDULING  If you do not already have an appointment, please call [phone] to make an appointment.     MORE INFORMATION  If you do not already have a [employer] account, sign up at: [email or web address]  You can access your medical information, make appointments, see lab results, billing information, and more.  If you have questions regarding this referral, please contact  the [facility] Referrals department at:             [phone]. Monday - Friday 8:00AM - 5:00PM.     Sincerely,    [facility]

## 2025-06-27 NOTE — PROGRESS NOTES
Patient referred to vascular care  Unfortunately our schedulers have been unable to reach patient despite multiple attempts  Unless patient establishes with a face-to-face visit in our office will be unable to take part in care  Await further patient contact.  Pending further contact, we will defer all further management of vascular disease and its risk factors to PCP and/or referring MD.    Michael J. Bloch, MD  Vascular Care    cc:     REJI Brown

## 2025-07-08 DIAGNOSIS — J45.20 MILD INTERMITTENT ASTHMA, UNSPECIFIED WHETHER COMPLICATED: ICD-10-CM

## 2025-07-08 RX ORDER — ALBUTEROL SULFATE 90 UG/1
2 INHALANT RESPIRATORY (INHALATION) EVERY 6 HOURS PRN
Qty: 8.5 G | Refills: 0 | Status: SHIPPED | OUTPATIENT
Start: 2025-07-08

## 2025-07-11 DIAGNOSIS — I82.401 ACUTE DEEP VEIN THROMBOSIS (DVT) OF RIGHT LOWER EXTREMITY, UNSPECIFIED VEIN (HCC): ICD-10-CM

## 2025-07-11 RX ORDER — APIXABAN 5 MG/1
5 TABLET, FILM COATED ORAL 2 TIMES DAILY
Qty: 60 TABLET | Refills: 0 | Status: SHIPPED | OUTPATIENT
Start: 2025-07-11

## 2025-07-11 NOTE — TELEPHONE ENCOUNTER
Received request via: Pharmacy    Was the patient seen in the last year in this department? Yes    Does the patient have an active prescription (recently filled or refills available) for medication(s) requested? No    Pharmacy Name: [id number] - [place], [place] - [address]     Does the patient have [facility] Plus and need 100-day supply? (This applies to ALL medications) NO

## 2025-08-26 ENCOUNTER — APPOINTMENT (OUTPATIENT)
Dept: RADIOLOGY | Facility: MEDICAL CENTER | Age: 55
End: 2025-08-26
Attending: STUDENT IN AN ORGANIZED HEALTH CARE EDUCATION/TRAINING PROGRAM
Payer: COMMERCIAL

## 2025-08-26 ENCOUNTER — HOSPITAL ENCOUNTER (OUTPATIENT)
Facility: MEDICAL CENTER | Age: 55
End: 2025-08-28
Attending: STUDENT IN AN ORGANIZED HEALTH CARE EDUCATION/TRAINING PROGRAM | Admitting: FAMILY MEDICINE
Payer: COMMERCIAL

## 2025-08-26 DIAGNOSIS — K12.2 CELLULITIS OF SUBMANDIBULAR REGION: Primary | ICD-10-CM

## 2025-08-26 DIAGNOSIS — D50.9 MICROCYTIC ANEMIA: ICD-10-CM

## 2025-08-26 LAB
ALBUMIN SERPL BCP-MCNC: 3.8 G/DL (ref 3.2–4.9)
ALBUMIN/GLOB SERPL: 1.2 G/DL
ALP SERPL-CCNC: 92 U/L (ref 30–99)
ALT SERPL-CCNC: 30 U/L (ref 2–50)
ANION GAP SERPL CALC-SCNC: 12 MMOL/L (ref 7–16)
ANISOCYTOSIS BLD QL SMEAR: ABNORMAL
AST SERPL-CCNC: 30 U/L (ref 12–45)
BASOPHILS # BLD AUTO: 0.4 % (ref 0–1.8)
BASOPHILS # BLD: 0.03 K/UL (ref 0–0.12)
BILIRUB SERPL-MCNC: 0.3 MG/DL (ref 0.1–1.5)
BUN SERPL-MCNC: 8 MG/DL (ref 8–22)
CALCIUM ALBUM COR SERPL-MCNC: 9.1 MG/DL (ref 8.5–10.5)
CALCIUM SERPL-MCNC: 8.9 MG/DL (ref 8.5–10.5)
CHLORIDE SERPL-SCNC: 103 MMOL/L (ref 96–112)
CO2 SERPL-SCNC: 21 MMOL/L (ref 20–33)
COMMENT 1642: NORMAL
CREAT SERPL-MCNC: 0.81 MG/DL (ref 0.5–1.4)
EOSINOPHIL # BLD AUTO: 0.24 K/UL (ref 0–0.51)
EOSINOPHIL NFR BLD: 3.5 % (ref 0–6.9)
ERYTHROCYTE [DISTWIDTH] IN BLOOD BY AUTOMATED COUNT: 43.3 FL (ref 35.9–50)
GFR SERPLBLD CREATININE-BSD FMLA CKD-EPI: 105 ML/MIN/1.73 M 2
GLOBULIN SER CALC-MCNC: 3.2 G/DL (ref 1.9–3.5)
GLUCOSE SERPL-MCNC: 147 MG/DL (ref 65–99)
HCT VFR BLD AUTO: 31.7 % (ref 42–52)
HGB BLD-MCNC: 9.1 G/DL (ref 14–18)
HYPOCHROMIA BLD QL SMEAR: ABNORMAL
IMM GRANULOCYTES # BLD AUTO: 0.02 K/UL (ref 0–0.11)
IMM GRANULOCYTES NFR BLD AUTO: 0.3 % (ref 0–0.9)
LYMPHOCYTES # BLD AUTO: 1.99 K/UL (ref 1–4.8)
LYMPHOCYTES NFR BLD: 29.1 % (ref 22–41)
MCH RBC QN AUTO: 18.6 PG (ref 27–33)
MCHC RBC AUTO-ENTMCNC: 28.7 G/DL (ref 32.3–36.5)
MCV RBC AUTO: 64.8 FL (ref 81.4–97.8)
MICROCYTES BLD QL SMEAR: ABNORMAL
MONOCYTES # BLD AUTO: 0.67 K/UL (ref 0–0.85)
MONOCYTES NFR BLD AUTO: 9.8 % (ref 0–13.4)
MORPHOLOGY BLD-IMP: NORMAL
NEUTROPHILS # BLD AUTO: 3.88 K/UL (ref 1.82–7.42)
NEUTROPHILS NFR BLD: 56.9 % (ref 44–72)
NRBC # BLD AUTO: 0 K/UL
NRBC BLD-RTO: 0 /100 WBC (ref 0–0.2)
OVALOCYTES BLD QL SMEAR: NORMAL
PLATELET # BLD AUTO: 381 K/UL (ref 164–446)
PMV BLD AUTO: 9.1 FL (ref 9–12.9)
POIKILOCYTOSIS BLD QL SMEAR: NORMAL
POTASSIUM SERPL-SCNC: 3.6 MMOL/L (ref 3.6–5.5)
PROT SERPL-MCNC: 7 G/DL (ref 6–8.2)
RBC # BLD AUTO: 4.89 M/UL (ref 4.7–6.1)
RBC BLD AUTO: PRESENT
SODIUM SERPL-SCNC: 136 MMOL/L (ref 135–145)
WBC # BLD AUTO: 6.8 K/UL (ref 4.8–10.8)

## 2025-08-26 PROCEDURE — 87040 BLOOD CULTURE FOR BACTERIA: CPT | Mod: 91

## 2025-08-26 PROCEDURE — 83036 HEMOGLOBIN GLYCOSYLATED A1C: CPT

## 2025-08-26 PROCEDURE — 80053 COMPREHEN METABOLIC PANEL: CPT

## 2025-08-26 PROCEDURE — 36415 COLL VENOUS BLD VENIPUNCTURE: CPT

## 2025-08-26 PROCEDURE — 700111 HCHG RX REV CODE 636 W/ 250 OVERRIDE (IP): Mod: JZ | Performed by: STUDENT IN AN ORGANIZED HEALTH CARE EDUCATION/TRAINING PROGRAM

## 2025-08-26 PROCEDURE — 99285 EMERGENCY DEPT VISIT HI MDM: CPT

## 2025-08-26 PROCEDURE — 96375 TX/PRO/DX INJ NEW DRUG ADDON: CPT

## 2025-08-26 PROCEDURE — 70491 CT SOFT TISSUE NECK W/DYE: CPT

## 2025-08-26 PROCEDURE — 96365 THER/PROPH/DIAG IV INF INIT: CPT

## 2025-08-26 PROCEDURE — 85025 COMPLETE CBC W/AUTO DIFF WBC: CPT

## 2025-08-26 PROCEDURE — 700117 HCHG RX CONTRAST REV CODE 255: Performed by: STUDENT IN AN ORGANIZED HEALTH CARE EDUCATION/TRAINING PROGRAM

## 2025-08-26 PROCEDURE — 700105 HCHG RX REV CODE 258: Performed by: STUDENT IN AN ORGANIZED HEALTH CARE EDUCATION/TRAINING PROGRAM

## 2025-08-26 RX ORDER — DEXAMETHASONE SODIUM PHOSPHATE 10 MG/ML
10 INJECTION, SOLUTION INTRAMUSCULAR; INTRAVENOUS ONCE
Status: COMPLETED | OUTPATIENT
Start: 2025-08-26 | End: 2025-08-26

## 2025-08-26 RX ADMIN — AMPICILLIN AND SULBACTAM 3 G: 1; 2 INJECTION, POWDER, FOR SOLUTION INTRAMUSCULAR; INTRAVENOUS at 22:29

## 2025-08-26 RX ADMIN — IOHEXOL 80 ML: 350 INJECTION, SOLUTION INTRAVENOUS at 22:49

## 2025-08-26 RX ADMIN — DEXAMETHASONE SODIUM PHOSPHATE 10 MG: 10 INJECTION INTRAMUSCULAR; INTRAVENOUS at 23:02

## 2025-08-26 ASSESSMENT — FIBROSIS 4 INDEX: FIB4 SCORE: 0.73

## 2025-08-26 ASSESSMENT — PAIN DESCRIPTION - PAIN TYPE: TYPE: ACUTE PAIN

## 2025-08-27 PROBLEM — R22.1 SUBMANDIBULAR SWELLING: Status: ACTIVE | Noted: 2025-08-27

## 2025-08-27 PROBLEM — M54.2 NECK PAIN: Status: ACTIVE | Noted: 2025-08-27

## 2025-08-27 PROBLEM — Z86.718 HISTORY OF DVT (DEEP VEIN THROMBOSIS): Status: ACTIVE | Noted: 2025-08-27

## 2025-08-27 PROBLEM — R22.0 SUBMANDIBULAR SWELLING: Status: ACTIVE | Noted: 2025-08-27

## 2025-08-27 LAB
BACTERIA BLD CULT: NORMAL
BACTERIA BLD CULT: NORMAL
EST. AVERAGE GLUCOSE BLD GHB EST-MCNC: 146 MG/DL
GLUCOSE BLD STRIP.AUTO-MCNC: 136 MG/DL (ref 65–99)
HBA1C MFR BLD: 6.7 % (ref 4–5.6)
SIGNIFICANT IND 70042: NORMAL
SIGNIFICANT IND 70042: NORMAL
SITE SITE: NORMAL
SITE SITE: NORMAL
SOURCE SOURCE: NORMAL
SOURCE SOURCE: NORMAL

## 2025-08-27 PROCEDURE — G0378 HOSPITAL OBSERVATION PER HR: HCPCS

## 2025-08-27 PROCEDURE — 700111 HCHG RX REV CODE 636 W/ 250 OVERRIDE (IP): Performed by: NURSE PRACTITIONER

## 2025-08-27 PROCEDURE — A9270 NON-COVERED ITEM OR SERVICE: HCPCS | Performed by: NURSE PRACTITIONER

## 2025-08-27 PROCEDURE — 700102 HCHG RX REV CODE 250 W/ 637 OVERRIDE(OP)

## 2025-08-27 PROCEDURE — 700111 HCHG RX REV CODE 636 W/ 250 OVERRIDE (IP): Mod: JZ

## 2025-08-27 PROCEDURE — 82962 GLUCOSE BLOOD TEST: CPT | Performed by: NURSE PRACTITIONER

## 2025-08-27 PROCEDURE — 700105 HCHG RX REV CODE 258: Performed by: NURSE PRACTITIONER

## 2025-08-27 PROCEDURE — 700105 HCHG RX REV CODE 258

## 2025-08-27 PROCEDURE — 96376 TX/PRO/DX INJ SAME DRUG ADON: CPT

## 2025-08-27 PROCEDURE — 700102 HCHG RX REV CODE 250 W/ 637 OVERRIDE(OP): Performed by: NURSE PRACTITIONER

## 2025-08-27 PROCEDURE — A9270 NON-COVERED ITEM OR SERVICE: HCPCS

## 2025-08-27 RX ORDER — ALBUTEROL SULFATE 90 UG/1
2 INHALANT RESPIRATORY (INHALATION) EVERY 6 HOURS PRN
Status: DISCONTINUED | OUTPATIENT
Start: 2025-08-27 | End: 2025-08-28 | Stop reason: HOSPADM

## 2025-08-27 RX ORDER — HYDROXYZINE HYDROCHLORIDE 50 MG/1
25 TABLET, FILM COATED ORAL ONCE
Status: COMPLETED | OUTPATIENT
Start: 2025-08-27 | End: 2025-08-27

## 2025-08-27 RX ORDER — OXYCODONE HYDROCHLORIDE 10 MG/1
10 TABLET ORAL EVERY 4 HOURS PRN
Refills: 0 | Status: DISCONTINUED | OUTPATIENT
Start: 2025-08-27 | End: 2025-08-28 | Stop reason: HOSPADM

## 2025-08-27 RX ORDER — ACETAMINOPHEN 325 MG/1
650 TABLET ORAL EVERY 6 HOURS PRN
Status: DISCONTINUED | OUTPATIENT
Start: 2025-08-27 | End: 2025-08-28 | Stop reason: HOSPADM

## 2025-08-27 RX ORDER — KETOROLAC TROMETHAMINE 15 MG/ML
15 INJECTION, SOLUTION INTRAMUSCULAR; INTRAVENOUS EVERY 6 HOURS
Status: DISPENSED | OUTPATIENT
Start: 2025-08-27 | End: 2025-08-27

## 2025-08-27 RX ORDER — PREDNISONE 50 MG/1
50 TABLET ORAL DAILY
Status: DISCONTINUED | OUTPATIENT
Start: 2025-08-27 | End: 2025-08-28 | Stop reason: HOSPADM

## 2025-08-27 RX ORDER — SODIUM CHLORIDE 9 MG/ML
INJECTION, SOLUTION INTRAVENOUS CONTINUOUS
Status: DISCONTINUED | OUTPATIENT
Start: 2025-08-27 | End: 2025-08-28 | Stop reason: HOSPADM

## 2025-08-27 RX ORDER — OXYCODONE HYDROCHLORIDE 5 MG/1
5 TABLET ORAL EVERY 4 HOURS PRN
Refills: 0 | Status: DISCONTINUED | OUTPATIENT
Start: 2025-08-27 | End: 2025-08-28 | Stop reason: HOSPADM

## 2025-08-27 RX ADMIN — SODIUM CHLORIDE: 9 INJECTION, SOLUTION INTRAVENOUS at 17:27

## 2025-08-27 RX ADMIN — AMPICILLIN AND SULBACTAM 3 G: 1; 2 INJECTION, POWDER, FOR SOLUTION INTRAMUSCULAR; INTRAVENOUS at 18:32

## 2025-08-27 RX ADMIN — OXYCODONE HYDROCHLORIDE 10 MG: 10 TABLET ORAL at 19:45

## 2025-08-27 RX ADMIN — SODIUM CHLORIDE: 9 INJECTION, SOLUTION INTRAVENOUS at 01:38

## 2025-08-27 RX ADMIN — ACETAMINOPHEN 650 MG: 325 TABLET ORAL at 19:44

## 2025-08-27 RX ADMIN — AMPICILLIN AND SULBACTAM 3 G: 1; 2 INJECTION, POWDER, FOR SOLUTION INTRAMUSCULAR; INTRAVENOUS at 21:11

## 2025-08-27 RX ADMIN — KETOROLAC TROMETHAMINE 15 MG: 15 INJECTION, SOLUTION INTRAMUSCULAR; INTRAVENOUS at 01:39

## 2025-08-27 RX ADMIN — ACETAMINOPHEN 650 MG: 325 TABLET ORAL at 11:50

## 2025-08-27 RX ADMIN — SODIUM CHLORIDE: 9 INJECTION, SOLUTION INTRAVENOUS at 19:49

## 2025-08-27 RX ADMIN — HYDROXYZINE HYDROCHLORIDE 25 MG: 50 TABLET ORAL at 21:11

## 2025-08-27 RX ADMIN — SODIUM CHLORIDE: 9 INJECTION, SOLUTION INTRAVENOUS at 11:27

## 2025-08-27 RX ADMIN — PREDNISONE 50 MG: 50 TABLET ORAL at 16:07

## 2025-08-27 RX ADMIN — OXYCODONE HYDROCHLORIDE 10 MG: 10 TABLET ORAL at 01:41

## 2025-08-27 RX ADMIN — ALBUTEROL SULFATE 2 PUFF: 90 AEROSOL, METERED RESPIRATORY (INHALATION) at 01:50

## 2025-08-27 RX ADMIN — OXYCODONE HYDROCHLORIDE 5 MG: 5 TABLET ORAL at 11:46

## 2025-08-27 ASSESSMENT — COGNITIVE AND FUNCTIONAL STATUS - GENERAL
SUGGESTED CMS G CODE MODIFIER MOBILITY: CH
DAILY ACTIVITIY SCORE: 24
MOBILITY SCORE: 24
SUGGESTED CMS G CODE MODIFIER DAILY ACTIVITY: CH

## 2025-08-27 ASSESSMENT — PAIN DESCRIPTION - PAIN TYPE
TYPE: ACUTE PAIN

## 2025-08-27 ASSESSMENT — LIFESTYLE VARIABLES
ALCOHOL_USE: NO
ON A TYPICAL DAY WHEN YOU DRINK ALCOHOL HOW MANY DRINKS DO YOU HAVE: 0
HAVE PEOPLE ANNOYED YOU BY CRITICIZING YOUR DRINKING: NO
TOTAL SCORE: 0
HOW MANY TIMES IN THE PAST YEAR HAVE YOU HAD 5 OR MORE DRINKS IN A DAY: 0
TOTAL SCORE: 0
TOTAL SCORE: 0
EVER FELT BAD OR GUILTY ABOUT YOUR DRINKING: NO
HAVE YOU EVER FELT YOU SHOULD CUT DOWN ON YOUR DRINKING: NO
AVERAGE NUMBER OF DAYS PER WEEK YOU HAVE A DRINK CONTAINING ALCOHOL: 0
EVER HAD A DRINK FIRST THING IN THE MORNING TO STEADY YOUR NERVES TO GET RID OF A HANGOVER: NO
DOES PATIENT WANT TO STOP DRINKING: NO
CONSUMPTION TOTAL: NEGATIVE

## 2025-08-27 ASSESSMENT — PATIENT HEALTH QUESTIONNAIRE - PHQ9
SUM OF ALL RESPONSES TO PHQ9 QUESTIONS 1 AND 2: 0
1. LITTLE INTEREST OR PLEASURE IN DOING THINGS: NOT AT ALL
2. FEELING DOWN, DEPRESSED, IRRITABLE, OR HOPELESS: NOT AT ALL

## 2025-08-27 ASSESSMENT — FIBROSIS 4 INDEX: FIB4 SCORE: 0.76

## 2025-08-28 VITALS
BODY MASS INDEX: 30.74 KG/M2 | HEIGHT: 68 IN | HEART RATE: 64 BPM | SYSTOLIC BLOOD PRESSURE: 115 MMHG | OXYGEN SATURATION: 98 % | RESPIRATION RATE: 18 BRPM | WEIGHT: 202.82 LBS | DIASTOLIC BLOOD PRESSURE: 68 MMHG | TEMPERATURE: 97 F

## 2025-08-28 LAB
ANION GAP SERPL CALC-SCNC: 10 MMOL/L (ref 7–16)
BUN SERPL-MCNC: 11 MG/DL (ref 8–22)
CALCIUM SERPL-MCNC: 8.5 MG/DL (ref 8.5–10.5)
CHLORIDE SERPL-SCNC: 109 MMOL/L (ref 96–112)
CO2 SERPL-SCNC: 20 MMOL/L (ref 20–33)
CREAT SERPL-MCNC: 0.66 MG/DL (ref 0.5–1.4)
ERYTHROCYTE [DISTWIDTH] IN BLOOD BY AUTOMATED COUNT: 45.2 FL (ref 35.9–50)
GFR SERPLBLD CREATININE-BSD FMLA CKD-EPI: 112 ML/MIN/1.73 M 2
GLUCOSE SERPL-MCNC: 137 MG/DL (ref 65–99)
HCT VFR BLD AUTO: 28.9 % (ref 42–52)
HGB BLD-MCNC: 8.3 G/DL (ref 14–18)
MCH RBC QN AUTO: 18.9 PG (ref 27–33)
MCHC RBC AUTO-ENTMCNC: 28.7 G/DL (ref 32.3–36.5)
MCV RBC AUTO: 66 FL (ref 81.4–97.8)
PLATELET # BLD AUTO: 350 K/UL (ref 164–446)
PMV BLD AUTO: 9.2 FL (ref 9–12.9)
POTASSIUM SERPL-SCNC: 4.2 MMOL/L (ref 3.6–5.5)
RBC # BLD AUTO: 4.38 M/UL (ref 4.7–6.1)
SODIUM SERPL-SCNC: 139 MMOL/L (ref 135–145)
WBC # BLD AUTO: 9.2 K/UL (ref 4.8–10.8)

## 2025-08-28 PROCEDURE — 700102 HCHG RX REV CODE 250 W/ 637 OVERRIDE(OP)

## 2025-08-28 PROCEDURE — 700111 HCHG RX REV CODE 636 W/ 250 OVERRIDE (IP): Mod: JZ | Performed by: NURSE PRACTITIONER

## 2025-08-28 PROCEDURE — 80048 BASIC METABOLIC PNL TOTAL CA: CPT

## 2025-08-28 PROCEDURE — 700105 HCHG RX REV CODE 258: Performed by: NURSE PRACTITIONER

## 2025-08-28 PROCEDURE — 85027 COMPLETE CBC AUTOMATED: CPT

## 2025-08-28 PROCEDURE — G0378 HOSPITAL OBSERVATION PER HR: HCPCS

## 2025-08-28 PROCEDURE — 96376 TX/PRO/DX INJ SAME DRUG ADON: CPT

## 2025-08-28 PROCEDURE — A9270 NON-COVERED ITEM OR SERVICE: HCPCS

## 2025-08-28 RX ADMIN — OXYCODONE HYDROCHLORIDE 10 MG: 10 TABLET ORAL at 06:43

## 2025-08-28 RX ADMIN — ACETAMINOPHEN 650 MG: 325 TABLET ORAL at 06:43

## 2025-08-28 RX ADMIN — PREDNISONE 50 MG: 50 TABLET ORAL at 06:43

## 2025-08-28 RX ADMIN — AMPICILLIN AND SULBACTAM 3 G: 1; 2 INJECTION, POWDER, FOR SOLUTION INTRAMUSCULAR; INTRAVENOUS at 04:01

## 2025-08-28 RX ADMIN — AMPICILLIN AND SULBACTAM 3 G: 1; 2 INJECTION, POWDER, FOR SOLUTION INTRAMUSCULAR; INTRAVENOUS at 08:21

## 2025-08-28 ASSESSMENT — PAIN DESCRIPTION - PAIN TYPE
TYPE: ACUTE PAIN
TYPE: ACUTE PAIN